# Patient Record
Sex: FEMALE | Race: OTHER | URBAN - METROPOLITAN AREA
[De-identification: names, ages, dates, MRNs, and addresses within clinical notes are randomized per-mention and may not be internally consistent; named-entity substitution may affect disease eponyms.]

---

## 2021-03-08 ENCOUNTER — TELEPHONE (OUTPATIENT)
Dept: FAMILY MEDICINE CLINIC | Age: 51
End: 2021-03-08

## 2021-03-08 NOTE — TELEPHONE ENCOUNTER
----- Message from Anny Falk sent at 3/8/2021  3:29 PM EST -----  Regarding: [facility] Front Office [employer] Message/Vendor [employer] first and last name: Birdia Fleischer [[id number]]      Reason for call: Provider Edy [address] Dr required yes/no and why: Yes      Best contact number(s):  [phone]      Details to clarify the request: pt is requesting confirmation of typre of care providers within practice is able to provide. Pt may need a chinese-mandarin .  Pt is requesting callback as early as 7:30am      Anny Falk

## 2021-03-12 ENCOUNTER — NURSE TRIAGE (OUTPATIENT)
Dept: OTHER | Facility: CLINIC | Age: 51
End: 2021-03-12

## 2021-03-12 NOTE — TELEPHONE ENCOUNTER
Heart beating fast. Feels short of breath. Reason for Disposition   Difficulty breathing    Answer Assessment - Initial Assessment Questions  1. DESCRIPTION: \"Please describe your heart rate or heart beat that you are having\" (e.g., fast/slow, regular/irregular, skipped or extra beats, \"palpitations\")      Feels like it is beating fast, palpitations. 2. ONSET: \"When did it start? \" (Minutes, hours or days)       Monday     3. DURATION: \"How long does it last\" (e.g., seconds, minutes, hours)  Unsure         4. PATTERN \"Does it come and go, or has it been constant since it started? \"  \"Does it get worse with exertion? \"   \"Are you feeling it now? \"      States she is not really paying attention. 5. TAP: \"Using your hand, can you tap out what you are feeling on a chair or table in front of you, so that I can hear? \" (Note: not all patients can do this)        N/a     6. HEART RATE: \"Can you tell me your heart rate? \" \"How many beats in 15 seconds? \"  (Note: not all patients can do this)        N/a     7. RECURRENT SYMPTOM: \"Have you ever had this before? \" If so, ask: \"When was the last time? \" and \"What happened that time? \"       No     8. CAUSE: \"What do you think is causing the palpitations? \"      1 year ago her mom passed away. She got emotional stating this. 9. CARDIAC HISTORY: \"Do you have any history of heart disease? \" (e.g., heart attack, angina, bypass surgery, angioplasty, arrhythmia)       No     10. OTHER SYMPTOMS: \"Do you have any other symptoms? \" (e.g., dizziness, chest pain, sweating, difficulty breathing)        Shortness of breath. 11. PREGNANCY: \"Is there any chance you are pregnant? \" \"When was your last menstrual period? \"        *No Answer*    Protocols used: HEART RATE AND HEARTBEAT QUESTIONS-ADULT-[place]    Patient called [facility] with red flag complaint. Call received from cold call. Brief description of triage: heart palpitations and shortness of breath.      Triage indicates for patient to go to ED. Caller plans to [facility] ED. Care advice provided, patient verbalizes understanding; denies any other questions or concerns; instructed to call back for any new or worsening symptoms. Attention Provider: Thank you for allowing me to participate in the care of your patient. The patient was connected to triage in response to information from calling the [employer]. Please do not respond through this encounter as the response is not directed to a shared pool.     Reminders:     Call [address] [place] [address] [address] Provider/Office    Care Advice - both instruction and documentation    Routing - PCP     PLEASE DELETE ALL RED TEXT PRIOR TO SIGNING NOTE

## 2021-03-15 ENCOUNTER — TELEPHONE (OUTPATIENT)
Dept: FAMILY MEDICINE CLINIC | Age: 51
End: 2021-03-15

## 2021-03-15 NOTE — TELEPHONE ENCOUNTER
----- Message from Marcelle Diane sent at 3/15/2021  8:02 AM EDT -----  Regarding: Dr Lj Ramirez: [phone]  Appointment not available    Caller's first and last name and relationship to patient (if not the patient):      Best contact [phone]      Preferred date and time:      Scheduled appointment date and time:      Reason for appointment:new patient-having dizziness and heart racing      Details to clarify the request:pt was seen at [address] [place] [address] on 3/12/21      Marcelle Diane

## 2021-03-15 NOTE — TELEPHONE ENCOUNTER
Bridgett Dolan has tried calling the pt but no answer and no return PC. Can nurses give directions to this pt? Should she go to the ED?

## 2021-03-17 ENCOUNTER — TELEPHONE (OUTPATIENT)
Dept: FAMILY MEDICINE CLINIC | Age: 51
End: 2021-03-17

## 2021-03-17 NOTE — TELEPHONE ENCOUNTER
Inna Wiggins         3/15/21 9:41 AM  Note     Candelario Miramontes has tried calling the pt but no answer and no return PC. Can nurses give directions to this pt?  Should she go to the ED?               3/15/21 9:41 AM  Patricia HALL routed this conversation to [facility] Nurses   Me  to Inna Wiggins         3/15/21 9:52 AM  Note     Tried calling patient x2 line was busy.

## 2021-03-17 NOTE — TELEPHONE ENCOUNTER
----- Message from Greg Valencia sent at 3/17/2021  2:28 PM EDT -----  Regarding: PARUL Amaral/Telephone  Patient return call    Caller's first and last name and relationship (if not the patient): pt      Best contact number(s): [phone]      Whose call is being returned: unknown      Details to clarify the request: Pt is unsure of reason for call.       Greg Valencia

## 2021-03-18 ENCOUNTER — VIRTUAL VISIT (OUTPATIENT)
Dept: FAMILY MEDICINE CLINIC | Age: 51
End: 2021-03-18
Payer: MEDICAID

## 2021-03-18 DIAGNOSIS — R06.09 DYSPNEA ON EXERTION: ICD-10-CM

## 2021-03-18 DIAGNOSIS — Z76.89 ENCOUNTER TO ESTABLISH CARE: ICD-10-CM

## 2021-03-18 DIAGNOSIS — R53.83 FATIGUE, UNSPECIFIED TYPE: ICD-10-CM

## 2021-03-18 DIAGNOSIS — R00.2 PALPITATIONS: Primary | ICD-10-CM

## 2021-03-18 PROCEDURE — 99443 PR PHYS/QHP TELEPHONE EVALUATION 21-30 MIN: CPT | Performed by: NURSE PRACTITIONER

## 2021-03-18 NOTE — PROGRESS NOTES
Chief Complaint   Patient presents with   Kurtz Establish Care    Fatigue     1. Have you been to the ER, urgent care clinic since your last visit? Hospitalized since your last visit? Yes  [facility] 9 3-2021 for weakness      2. Have you seen or consulted any other health care providers outside of the [address] [place] [address] since your last visit? Include any pap smears or colon screening. No    Abuse Screening Questionnaire 3/18/2021   Do you ever feel afraid of your partner? N   Are you in a relationship with someone who physically or mentally threatens you? N   Is it safe for you to go home? Y     3 most recent PHQ Screens 3/18/2021   Little interest or pleasure in doing things Not at all   Feeling down, depressed, irritable, or hopeless Not at all   Total Score PHQ 2 0     Learning Assessment 3/18/2021   PRIMARY LEARNER Patient   PRIMARY LANGUAGE ENGLISH   LEARNER PREFERENCE PRIMARY LISTENING   ANSWERED BY patient   RELATIONSHIP SELF     Fall Risk Assessment, last 12 mths 3/18/2021   Able to walk? Yes   Fall in past 12 months? 0   Do you feel unsteady?  0   Are you worried about falling 0

## 2021-03-19 NOTE — PROGRESS NOTES
Boo Matos is a 49 y.o. female, evaluated via audio-only technology on 3/18/2021 for Establish Care and Fatigue  . Seen at  [facility] for fatigue and palpitations. I personally reviewed the hospital encounter. Condition persists. Having CLARKE and palpitations. We  discussed a referral to Dr. Devin Dietrich cardiology for a work up. She is receptive. Assessment & Plan:     Diagnoses and all orders for this visit:    1. Palpitations  -     REFERRAL TO CARDIOLOGY    2. Dyspnea on exertion  -     REFERRAL TO CARDIOLOGY    3. Fatigue, unspecified type  -     REFERRAL TO CARDIOLOGY    4. Encounter to establish care      The complexity of medical decision making for this visit is moderate   Follow-up and Dispositions    · Return in about 1 month (around 4/18/2021). Routing History          12  Subjective:       Prior to Admission medications    Not on File     There is no problem list on file for this patient. There are no active problems to display for this patient. No Known Allergies  History reviewed. No pertinent past medical history. History reviewed. No pertinent surgical history. History reviewed. No pertinent family history. Social History     Tobacco Use    Smoking status: Not on file   Substance Use Topics    Alcohol use: Not on file       ROS  Pertinent items are noted in the HPI  Patient-Reported Vitals 3/18/2021   Patient-Reported Temperature 97.7        Boo Matos, who was evaluated through a patient-initiated, synchronous (real-time) audio only encounter, and/or her healthcare decision maker, is aware that it is a billable service, with coverage as determined by her insurance carrier. She provided verbal consent to proceed: Yes. She has not had a related appointment within my department in the past 7 days or scheduled within the next 24 hours.       Total Time: minutes: 21-30 minutes    Yen Baron NP

## 2021-04-19 ENCOUNTER — OFFICE VISIT (OUTPATIENT)
Dept: FAMILY MEDICINE CLINIC | Age: 51
End: 2021-04-19
Payer: MEDICAID

## 2021-04-19 VITALS
BODY MASS INDEX: 24.26 KG/M2 | DIASTOLIC BLOOD PRESSURE: 72 MMHG | HEART RATE: 71 BPM | RESPIRATION RATE: 18 BRPM | HEIGHT: 60 IN | TEMPERATURE: 97.6 F | SYSTOLIC BLOOD PRESSURE: 118 MMHG | WEIGHT: 123.6 LBS | OXYGEN SATURATION: 100 %

## 2021-04-19 DIAGNOSIS — R10.9 STOMACH PAIN: ICD-10-CM

## 2021-04-19 DIAGNOSIS — R00.2 PALPITATIONS: Primary | ICD-10-CM

## 2021-04-19 DIAGNOSIS — Z71.89 GRIEF COUNSELING: ICD-10-CM

## 2021-04-19 DIAGNOSIS — R06.09 DYSPNEA ON EXERTION: ICD-10-CM

## 2021-04-19 DIAGNOSIS — Z11.59 ENCOUNTER FOR HEPATITIS C SCREENING TEST FOR LOW RISK PATIENT: ICD-10-CM

## 2021-04-19 DIAGNOSIS — R53.83 FATIGUE, UNSPECIFIED TYPE: ICD-10-CM

## 2021-04-19 PROCEDURE — 99214 OFFICE O/P EST MOD 30 MIN: CPT | Performed by: NURSE PRACTITIONER

## 2021-04-19 PROCEDURE — 93000 ELECTROCARDIOGRAM COMPLETE: CPT | Performed by: NURSE PRACTITIONER

## 2021-04-19 PROCEDURE — 36415 COLL VENOUS BLD VENIPUNCTURE: CPT | Performed by: NURSE PRACTITIONER

## 2021-04-19 NOTE — PROGRESS NOTES
1. Have you been to the ER, urgent care clinic since your last visit? Hospitalized since your last visit? Yes ER 3-2021 [facility] for palpitations and dizziness    2. Have you seen or consulted any other health care providers outside of the [address] [place] [address] since your last visit? Include any pap smears or colon screening.  No      Chief Complaint   Patient presents with    Establish Care    Palpitations     f/u  ER  [facility]         Visit Vitals  /72 (BP 1 Location: Left upper arm, BP Patient Position: Sitting, BP Cuff Size: Adult)   Pulse 71   Temp 97.6 °F (36.4 °C) (Temporal)   Resp 18   Ht 5' (1.524 m)   Wt 123 lb 9.6 oz (56.1 kg)   LMP 03/19/2021 (Approximate)   SpO2 100%   BMI 24.14 kg/m²       Pain Scale: 0 - No pain/10  Pain Location:

## 2021-04-20 ENCOUNTER — OFFICE VISIT (OUTPATIENT)
Dept: FAMILY MEDICINE CLINIC | Age: 51
End: 2021-04-20
Payer: MEDICAID

## 2021-04-20 VITALS
BODY MASS INDEX: 24.26 KG/M2 | TEMPERATURE: 98 F | RESPIRATION RATE: 18 BRPM | OXYGEN SATURATION: 98 % | HEART RATE: 72 BPM | DIASTOLIC BLOOD PRESSURE: 62 MMHG | HEIGHT: 60 IN | WEIGHT: 123.6 LBS | SYSTOLIC BLOOD PRESSURE: 100 MMHG

## 2021-04-20 DIAGNOSIS — N60.02 CYST OF LEFT BREAST: ICD-10-CM

## 2021-04-20 DIAGNOSIS — Z01.419 WELL WOMAN EXAM: Primary | ICD-10-CM

## 2021-04-20 DIAGNOSIS — N60.01 BREAST CYST, RIGHT: ICD-10-CM

## 2021-04-20 DIAGNOSIS — Z12.11 COLON CANCER SCREENING: ICD-10-CM

## 2021-04-20 LAB
25(OH)D3 SERPL-MCNC: 13.8 NG/ML (ref 30–100)
ALBUMIN SERPL-MCNC: 4.3 G/DL (ref 3.5–5)
ALBUMIN/GLOB SERPL: 1.3 {RATIO} (ref 1.1–2.2)
ALP SERPL-CCNC: 75 U/L (ref 45–117)
ALT SERPL-CCNC: 19 U/L (ref 12–78)
ANION GAP SERPL CALC-SCNC: 8 MMOL/L (ref 5–15)
AST SERPL-CCNC: 12 U/L (ref 15–37)
BASOPHILS # BLD: 0 K/UL (ref 0–0.1)
BASOPHILS NFR BLD: 1 % (ref 0–1)
BILIRUB SERPL-MCNC: 0.3 MG/DL (ref 0.2–1)
BUN SERPL-MCNC: 10 MG/DL (ref 6–20)
BUN/CREAT SERPL: 17 (ref 12–20)
CALCIUM SERPL-MCNC: 8.9 MG/DL (ref 8.5–10.1)
CHLORIDE SERPL-SCNC: 106 MMOL/L (ref 97–108)
CHOLEST SERPL-MCNC: 219 MG/DL
CO2 SERPL-SCNC: 25 MMOL/L (ref 21–32)
CREAT SERPL-MCNC: 0.58 MG/DL (ref 0.55–1.02)
DIFFERENTIAL METHOD BLD: NORMAL
EOSINOPHIL # BLD: 0 K/UL (ref 0–0.4)
EOSINOPHIL NFR BLD: 0 % (ref 0–7)
ERYTHROCYTE [DISTWIDTH] IN BLOOD BY AUTOMATED COUNT: 13.3 % (ref 11.5–14.5)
FOLATE SERPL-MCNC: 19.1 NG/ML (ref 5–21)
GLOBULIN SER CALC-MCNC: 3.3 G/DL (ref 2–4)
GLUCOSE SERPL-MCNC: 90 MG/DL (ref 65–100)
HCT VFR BLD AUTO: 41.4 % (ref 35–47)
HCV AB SERPL QL IA: NONREACTIVE
HCV COMMENT,HCGAC: NORMAL
HDLC SERPL-MCNC: 59 MG/DL
HDLC SERPL: 3.7 {RATIO} (ref 0–5)
HGB BLD-MCNC: 12.7 G/DL (ref 11.5–16)
IMM GRANULOCYTES # BLD AUTO: 0 K/UL (ref 0–0.04)
IMM GRANULOCYTES NFR BLD AUTO: 0 % (ref 0–0.5)
LDLC SERPL CALC-MCNC: 127 MG/DL (ref 0–100)
LIPID PROFILE,FLP: ABNORMAL
LYMPHOCYTES # BLD: 1.9 K/UL (ref 0.8–3.5)
LYMPHOCYTES NFR BLD: 32 % (ref 12–49)
MCH RBC QN AUTO: 28.9 PG (ref 26–34)
MCHC RBC AUTO-ENTMCNC: 30.7 G/DL (ref 30–36.5)
MCV RBC AUTO: 94.1 FL (ref 80–99)
MONOCYTES # BLD: 0.4 K/UL (ref 0–1)
MONOCYTES NFR BLD: 6 % (ref 5–13)
NEUTS SEG # BLD: 3.6 K/UL (ref 1.8–8)
NEUTS SEG NFR BLD: 61 % (ref 32–75)
NRBC # BLD: 0 K/UL (ref 0–0.01)
NRBC BLD-RTO: 0 PER 100 WBC
PLATELET # BLD AUTO: 307 K/UL (ref 150–400)
PMV BLD AUTO: 10.1 FL (ref 8.9–12.9)
POTASSIUM SERPL-SCNC: 4.1 MMOL/L (ref 3.5–5.1)
PROT SERPL-MCNC: 7.6 G/DL (ref 6.4–8.2)
RBC # BLD AUTO: 4.4 M/UL (ref 3.8–5.2)
SODIUM SERPL-SCNC: 139 MMOL/L (ref 136–145)
TRIGL SERPL-MCNC: 165 MG/DL (ref ?–150)
VIT B12 SERPL-MCNC: 797 PG/ML (ref 193–986)
VLDLC SERPL CALC-MCNC: 33 MG/DL
WBC # BLD AUTO: 5.9 K/UL (ref 3.6–11)

## 2021-04-20 PROCEDURE — 99396 PREV VISIT EST AGE 40-64: CPT | Performed by: NURSE PRACTITIONER

## 2021-04-20 NOTE — PROGRESS NOTES
Subjective:   49 y.o. female for Well Woman Check. No LMP recorded. Menses due this week. Social History: not sexually active.  passed away recently. Pertinent past medical hstory: no history of HTN, DVT, CAD, DM, liver disease, migraines or smoking. There is no problem list on file for this patient. There are no active problems to display for this patient. No Known Allergies  History reviewed. No pertinent past medical history. History reviewed. No pertinent surgical history. Family History   Problem Relation Age of Onset    Diabetes Mother     Hypertension Mother    [address] [facility] Arthritis-rheumatoid Father      Social History     Tobacco Use    Smoking status: Never Smoker    Smokeless tobacco: Never Used   Substance Use Topics    Alcohol use: Never     Frequency: Never     Binge frequency: Never        ROS:  Feeling well. No dyspnea or chest pain on exertion. No abdominal pain, change in bowel habits, black or bloody stools. No urinary tract symptoms. GYN ROS: normal menses, no abnormal bleeding, pelvic pain or discharge, she complains of  Lumps both right d left and left breasts  . No neurological complaints. Objective:     Visit Vitals  /62 (BP 1 Location: Right upper arm, BP Patient Position: Sitting, BP Cuff Size: Adult)   Pulse 72   Temp 98 °F (36.7 °C) (Core)   Resp 18   Ht 5' (1.524 m)   Wt 123 lb 9.6 oz (56.1 kg)   SpO2 98%   BMI 24.14 kg/m²     The patient appears well, alert, oriented x 3, in no distress. ENT normal.  Neck supple. No adenopathy or thyromegaly. LOULOU. Lungs are clear, good air entry, no wheezes, rhonchi or rales. S1 and S2 normal, no murmurs, regular rate and rhythm. Abdomen soft without tenderness, guarding, mass or organomegaly. Extremities show no edema, normal peripheral pulses. Neurological is normal, no focal findings. BREAST EXAM:  Right breast palpable lump x 2 -4 o clock and 8 o clock  No dimpling , nipple discharge or retraction.   No palpable axillary lymph nodes. Left breast palpable lump at 5  O'clock . All lumps/movable cyst like. PELVIC EXAM: normal external genitalia, vulva, vagina, cervix, uterus and adnexa. Menses noted at the cervical OS. Assessment/Plan:   well woman  mammogram  pap smear  return annually or prn    ICD-10-CM ICD-9-CM    1. Well woman exam  Z01.419 V72.31 PAP IG, APTIMA HPV AND RFX 16/18,45 ([id number])      OCCULT BLOOD IMMUNOASSAY,DIAGNOSTIC      PAP IG, APTIMA HPV AND RFX 16/18,45 ([id number])   2. Cyst of left breast  N60.02 610.0 ILIA 3D MISTY W MAMMO BI SCREENING INCL CAD   3. Breast cyst, right  N60.01 610.0 ILIA 3D MISTY W MAMMO BI SCREENING INCL CAD   4. Colon cancer screening  Z12.11 V76.51 OCCULT BLOOD IMMUNOASSAY,DIAGNOSTIC     Encounter Diagnoses   Name Primary?  Well woman exam Yes    Cyst of left breast     Breast cyst, right     Colon cancer screening      Orders Placed This Encounter    ILIA 3D MISTY W MAMMO BI SCREENING INCL CAD    OCCULT BLOOD IMMUNOASSAY,DIAGNOSTIC    PAP IG, APTIMA HPV AND RFX 16/18,45 ([id number])     Diagnoses and all orders for this visit:    1. Well woman exam  -     PAP IG, APTIMA HPV AND RFX 16/18,45 ([id number]); Future  -     OCCULT BLOOD IMMUNOASSAY,DIAGNOSTIC    2. Cyst of left breast  -     ILIA 3D MISTY W MAMMO BI SCREENING INCL CAD; Future    3. Breast cyst, right  -     ILIA 3D MISTY W MAMMO BI SCREENING INCL CAD; Future    4. Colon cancer screening  -     OCCULT BLOOD IMMUNOASSAY,DIAGNOSTIC        .

## 2021-04-20 NOTE — PROGRESS NOTES
1. Have you been to the ER, urgent care clinic since your last visit? Hospitalized since your last visit? No    2. Have you seen or consulted any other health care providers outside of the [address] [place] [address] since your last visit? Include any pap smears or colon screening.  No      Chief Complaint   Patient presents with    Well Woman         Visit Vitals  /62 (BP 1 Location: Right upper arm, BP Patient Position: Sitting, BP Cuff Size: Adult)   Pulse 72   Temp 98 °F (36.7 °C) (Core)   Ht 5' (1.524 m)   Wt 123 lb 9.6 oz (56.1 kg)   SpO2 98%   BMI 24.14 kg/m²       Pain Scale: 0 - No pain/10  Pain Location:

## 2021-04-21 NOTE — PROGRESS NOTES
Good news B vitamin levels are within normal limits   Negative screening for hepatitis C   CBC no anemia   Areas to address   Vitamin D level is low . Sending in a RX for vitamin D 50,000 ul take 1 pill every 7 days.   Recheck vitamin D level in 2 months   Metabolic panel good liver and kidney function  Cholesterol level is a little elevated   Healthy eating plan   \" Emphasizes vegetables, fruits, whole grains, and fat-free or low-fat dairy products  \" Includes lean meats, poultry, fish, beans, eggs, and nuts  \" Limits saturated and trans fats, sodium, and added sugars

## 2021-04-21 NOTE — ACP (ADVANCE CARE PLANNING)
Discussed importance of advanced medical directives with patient. Patient is capable of making decisions.  Jamar Celeste NP-C

## 2021-04-22 LAB — HEMOCCULT STL QL IA: NEGATIVE

## 2021-04-23 NOTE — ACP (ADVANCE CARE PLANNING)
Discussed importance of advanced medical directives with patient. Patient is capable of making decisions.  Sade Moise NP-C

## 2021-04-23 NOTE — PROGRESS NOTES
Subjective:     Yasemin Lyle is a 49 y.o. female who presents today with the following:  Chief Complaint   Patient presents with    [facility]    Palpitations     f/u  ER  [facility] [place]    Grief Counseling      passed 1 month       There is no problem list on file for this patient. COMPLIANT WITH MEDICATION:    Denies chest pain, dyspnea, (+) palpitations,(-) headache and (-) blurred vision. Blood pressure normotensive. Palpitations persistent on a daily basis. , comes in waves. Resolves with rest.     Grief lost  a month ago to Magdalene. Very tearful. depression screening addressed not at risk    abuse screening addressed denies    learning assessment addressed reviewed nurses notes    fall risk addressed not at risk    HM: addressed    ROS:  Gen: denies fever, chills, fatigue, weight loss, weight gain  HEENT:denies blurry vision, nasal congestion, sore throat  Resp: denies dypsnea, cough, wheezing  CV: denies chest pain radiating to the jaws or arms, palpitations, lower extremity edema  Abd: denies nausea, vomiting, diarrhea, constipation  Neuro: denies numbness/tingling  Endo: denies polyuria, polydipsia, heat/cold intolerance  Heme: no lymphadenopathy    No Known Allergies    No current outpatient medications on file. History reviewed. No pertinent past medical history. History reviewed. No pertinent surgical history.     Social History     Tobacco Use   Smoking Status Never Smoker   Smokeless Tobacco Never Used       Social History     Socioeconomic History    Marital status:      Spouse name: Not on file    Number of children: Not on file    Years of education: Not on file    Highest education level: Not on file   Tobacco Use    Smoking status: Never Smoker    Smokeless tobacco: Never Used   Substance and Sexual Activity    Alcohol use: Never     Frequency: Never     Binge frequency: Never    Drug use: Never       Family History   Problem Relation Age of Onset    Diabetes Mother     Hypertension Mother     Arthritis-rheumatoid Father          Objective:     Visit Vitals  /72 (BP 1 Location: Left upper arm, BP Patient Position: Sitting, BP Cuff Size: Adult)   Pulse 71   Temp 97.6 °F (36.4 °C) (Temporal)   Resp 18   Ht 5' (1.524 m)   Wt 123 lb 9.6 oz (56.1 kg)   LMP 03/19/2021 (Approximate)   SpO2 100%   BMI 24.14 kg/m²     Body mass index is 24.14 kg/m². General: Alert and oriented. No acute distress. Well nourished  HEENT :  Ears:TMs are normal. Canals are clear. Eyes: pupils equal, round, react to light and accommodation. Extra ocular movements intact. Nose: patent. Mouth and throat is clear. Neck:supple full range of motion no thyromegaly. Trachea midline, No carotid bruits. No significant lymphadenopathy  Lungs[de-identified] clear to auscultation without wheezes, rales, or rhonchi. Heart :RRR, S1 & S2 are normal intensity. No murmur; no gallop  Abdomen: bowel sounds active. No tenderness, guarding, rebound, masses, hepatic or spleen enlargement  Back: no CVA tenderness. Extremities: without clubbing, cyanosis, or edema  Pulses: radial and femoral pulses are normal  Neuro: HMF intact. Cranial nerves II through XII grossly normal.  Motor: is 5 over 5 and symmetrical.   Deep tendon reflexes: +2 equal  Psych:appropriate behavior, mood, affect and judgement. Results for orders placed or performed in visit on 04/19/21   VITAMIN D, 25 HYDROXY   Result Value Ref Range    Vitamin D 25-Hydroxy 13.8 (L) 30 - 100 ng/mL   VITAMIN B12 & FOLATE   Result Value Ref Range    Vitamin B12 797 193 - 986 pg/mL    Folate 19.1 5.0 - 21.0 ng/mL   HEPATITIS C AB   Result Value Ref Range    Hep C virus Ab Interp.  NONREACTIVE NONREACTIVE      Hep C  virus Ab comment Method used is Siemens Advia Centaur     METABOLIC PANEL, COMPREHENSIVE   Result Value Ref Range    Sodium 139 136 - 145 mmol/L    Potassium 4.1 3.5 - 5.1 mmol/L    Chloride 106 97 - 108 mmol/L    CO2 25 21 - 32 mmol/L Anion gap 8 5 - 15 mmol/L    Glucose 90 65 - 100 mg/dL    BUN 10 6 - 20 MG/DL    Creatinine 0.58 0.55 - 1.02 MG/DL    BUN/Creatinine ratio 17 12 - 20      GFR est AA >60 >60 ml/min/1.73m2    GFR est non-AA >60 >60 ml/min/1.73m2    Calcium 8.9 8.5 - 10.1 MG/DL    Bilirubin, total 0.3 0.2 - 1.0 MG/DL    ALT (SGPT) 19 12 - 78 U/L    AST (SGOT) 12 (L) 15 - 37 U/L    Alk. phosphatase 75 45 - 117 U/L    Protein, total 7.6 6.4 - 8.2 g/dL    Albumin 4.3 3.5 - 5.0 g/dL    Globulin 3.3 2.0 - 4.0 g/dL    A-G Ratio 1.3 1.1 - 2.2     LIPID PANEL   Result Value Ref Range    LIPID PROFILE          Cholesterol, total 219 (H) <200 MG/DL    Triglyceride 165 (H) <150 MG/DL    HDL Cholesterol 59 MG/DL    LDL, calculated 127 (H) 0 - 100 MG/DL    VLDL, calculated 33 MG/DL    CHOL/HDL Ratio 3.7 0.0 - 5.0     CBC WITH AUTOMATED DIFF   Result Value Ref Range    WBC 5.9 3.6 - 11.0 K/uL    RBC 4.40 3.80 - 5.20 M/uL    HGB 12.7 11.5 - 16.0 g/dL    HCT 41.4 35.0 - 47.0 %    MCV 94.1 80.0 - 99.0 FL    MCH 28.9 26.0 - 34.0 PG    MCHC 30.7 30.0 - 36.5 g/dL    RDW 13.3 11.5 - 14.5 %    PLATELET 160 325 - 734 K/uL    MPV 10.1 8.9 - 12.9 FL    NRBC 0.0 0  WBC    ABSOLUTE NRBC 0.00 0.00 - 0.01 K/uL    NEUTROPHILS 61 32 - 75 %    LYMPHOCYTES 32 12 - 49 %    MONOCYTES 6 5 - 13 %    EOSINOPHILS 0 0 - 7 %    BASOPHILS 1 0 - 1 %    IMMATURE GRANULOCYTES 0 0.0 - 0.5 %    ABS. NEUTROPHILS 3.6 1.8 - 8.0 K/UL    ABS. LYMPHOCYTES 1.9 0.8 - 3.5 K/UL    ABS. MONOCYTES 0.4 0.0 - 1.0 K/UL    ABS. EOSINOPHILS 0.0 0.0 - 0.4 K/UL    ABS. BASOPHILS 0.0 0.0 - 0.1 K/UL    ABS. IMM.  GRANS. 0.0 0.00 - 0.04 K/UL    DF AUTOMATED         Results for orders placed or performed in visit on 04/19/21   VITAMIN D, 25 HYDROXY   Result Value Ref Range    Vitamin D 25-Hydroxy 13.8 (L) 30 - 100 ng/mL   VITAMIN B12 & FOLATE   Result Value Ref Range    Vitamin B12 797 193 - 986 pg/mL    Folate 19.1 5.0 - 21.0 ng/mL   HEPATITIS C AB   Result Value Ref Range    Hep C virus Ab Interp. NONREACTIVE NONREACTIVE      Hep C  virus Ab comment Method used is Siemens Advia Centaur     METABOLIC PANEL, COMPREHENSIVE   Result Value Ref Range    Sodium 139 136 - 145 mmol/L    Potassium 4.1 3.5 - 5.1 mmol/L    Chloride 106 97 - 108 mmol/L    CO2 25 21 - 32 mmol/L    Anion gap 8 5 - 15 mmol/L    Glucose 90 65 - 100 mg/dL    BUN 10 6 - 20 MG/DL    Creatinine 0.58 0.55 - 1.02 MG/DL    BUN/Creatinine ratio 17 12 - 20      GFR est AA >60 >60 ml/min/1.73m2    GFR est non-AA >60 >60 ml/min/1.73m2    Calcium 8.9 8.5 - 10.1 MG/DL    Bilirubin, total 0.3 0.2 - 1.0 MG/DL    ALT (SGPT) 19 12 - 78 U/L    AST (SGOT) 12 (L) 15 - 37 U/L    Alk. phosphatase 75 45 - 117 U/L    Protein, total 7.6 6.4 - 8.2 g/dL    Albumin 4.3 3.5 - 5.0 g/dL    Globulin 3.3 2.0 - 4.0 g/dL    A-G Ratio 1.3 1.1 - 2.2     LIPID PANEL   Result Value Ref Range    LIPID PROFILE          Cholesterol, total 219 (H) <200 MG/DL    Triglyceride 165 (H) <150 MG/DL    HDL Cholesterol 59 MG/DL    LDL, calculated 127 (H) 0 - 100 MG/DL    VLDL, calculated 33 MG/DL    CHOL/HDL Ratio 3.7 0.0 - 5.0     CBC WITH AUTOMATED DIFF   Result Value Ref Range    WBC 5.9 3.6 - 11.0 K/uL    RBC 4.40 3.80 - 5.20 M/uL    HGB 12.7 11.5 - 16.0 g/dL    HCT 41.4 35.0 - 47.0 %    MCV 94.1 80.0 - 99.0 FL    MCH 28.9 26.0 - 34.0 PG    MCHC 30.7 30.0 - 36.5 g/dL    RDW 13.3 11.5 - 14.5 %    PLATELET 415 400 - 994 K/uL    MPV 10.1 8.9 - 12.9 FL    NRBC 0.0 0  WBC    ABSOLUTE NRBC 0.00 0.00 - 0.01 K/uL    NEUTROPHILS 61 32 - 75 %    LYMPHOCYTES 32 12 - 49 %    MONOCYTES 6 5 - 13 %    EOSINOPHILS 0 0 - 7 %    BASOPHILS 1 0 - 1 %    IMMATURE GRANULOCYTES 0 0.0 - 0.5 %    ABS. NEUTROPHILS 3.6 1.8 - 8.0 K/UL    ABS. LYMPHOCYTES 1.9 0.8 - 3.5 K/UL    ABS. MONOCYTES 0.4 0.0 - 1.0 K/UL    ABS. EOSINOPHILS 0.0 0.0 - 0.4 K/UL    ABS. BASOPHILS 0.0 0.0 - 0.1 K/UL    ABS. IMM. GRANS. 0.0 0.00 - 0.04 K/UL    DF AUTOMATED         Assessment/ Plan:     1.  Dyspnea on exertion  Referral to cariology . - CBC WITH AUTOMATED DIFF; Future  - LIPID PANEL; Future  - METABOLIC PANEL, COMPREHENSIVE; Future  - COLLECTION VENOUS BLOOD,VENIPUNCTURE; Future  - AMB POC EKG ROUTINE W/ 12 LEADS, INTER & REP  - COLLECTION VENOUS BLOOD,VENIPUNCTURE  - METABOLIC PANEL, COMPREHENSIVE  - LIPID PANEL  - CBC WITH AUTOMATED DIFF    2. Palpitations  Referral to cardiologist placed prior visit . Will monitor as needed. - CBC WITH AUTOMATED DIFF; Future  - LIPID PANEL; Future  - METABOLIC PANEL, COMPREHENSIVE; Future  - COLLECTION VENOUS BLOOD,VENIPUNCTURE; Future  - AMB POC EKG ROUTINE W/ 12 LEADS, INTER & REP  - COLLECTION VENOUS BLOOD,VENIPUNCTURE  - METABOLIC PANEL, COMPREHENSIVE  - LIPID PANEL  - CBC WITH AUTOMATED DIFF    3. Fatigue, unspecified type    - CBC WITH AUTOMATED DIFF; Future  - LIPID PANEL; Future  - METABOLIC PANEL, COMPREHENSIVE; Future  - COLLECTION VENOUS BLOOD,VENIPUNCTURE; Future  - VITAMIN B12 & FOLATE; Future  - VITAMIN D, 25 HYDROXY; Future  - VITAMIN D, 25 HYDROXY  - VITAMIN B12 & FOLATE  - COLLECTION VENOUS BLOOD,VENIPUNCTURE  - METABOLIC PANEL, COMPREHENSIVE  - LIPID PANEL  - CBC WITH AUTOMATED DIFF    4. Stomach pain  Responds well to Tums. - CBC WITH AUTOMATED DIFF; Future  - LIPID PANEL; Future  - METABOLIC PANEL, COMPREHENSIVE; Future  - COLLECTION VENOUS BLOOD,VENIPUNCTURE; Future  - COLLECTION VENOUS BLOOD,VENIPUNCTURE  - METABOLIC PANEL, COMPREHENSIVE  - LIPID PANEL  - CBC WITH AUTOMATED DIFF    5. Grief counseling  Support provided. Look for opportunities to develop friendship We discussed relaxation techniques . 6. Encounter for hepatitis C screening test for low risk patient    - HEPATITIS C AB;  Future  - HEPATITIS C AB      Orders Placed This Encounter    COLLECTION VENOUS BLOOD,VENIPUNCTURE     Standing Status:   Future     Number of Occurrences:   1     Standing Expiration Date:   5/19/2021    CBC WITH AUTOMATED DIFF     Standing Status:   Future     Number of Occurrences: 1     Standing Expiration Date:   5/19/2021    LIPID PANEL     Standing Status:   Future     Number of Occurrences:   1     Standing Expiration Date:   5/65/3629    METABOLIC PANEL, COMPREHENSIVE     Standing Status:   Future     Number of Occurrences:   1     Standing Expiration Date:   5/19/2021    HEPATITIS C AB     Standing Status:   Future     Number of Occurrences:   1     Standing Expiration Date:   5/19/2021    VITAMIN B12 & FOLATE     Standing Status:   Future     Number of Occurrences:   1     Standing Expiration Date:   5/19/2021    VITAMIN D, 25 HYDROXY     Standing Status:   Future     Number of Occurrences:   1     Standing Expiration Date:   4/19/2022    AMB POC EKG ROUTINE W/ 12 LEADS, INTER & REP     Order Specific Question:   Reason for Exam:     Answer:   palpitations         Verbal and written instructions (see AVS) provided. Patient expresses understanding of diagnosis and treatment plan.     Health Maintenance Due   Topic Date Due    COVID-19 Vaccine (1) Never done    DTaP/Tdap/Td series (1 - Tdap) Never done    PAP AKA CERVICAL CYTOLOGY  Never done    Shingrix Vaccine Age 50> (1 of 2) Never done    Breast Cancer Screen Mammogram  Never done               [facility]

## 2021-04-26 ENCOUNTER — HOSPITAL ENCOUNTER (OUTPATIENT)
Dept: ULTRASOUND IMAGING | Age: 51
Discharge: HOME | End: 2021-04-26
Attending: NURSE PRACTITIONER
Payer: MEDICAID

## 2021-04-26 ENCOUNTER — HOSPITAL ENCOUNTER (OUTPATIENT)
Dept: MAMMOGRAPHY | Age: 51
Discharge: HOME | End: 2021-04-26
Attending: NURSE PRACTITIONER
Payer: MEDICAID

## 2021-04-26 DIAGNOSIS — R92.8 ABNORMAL MAMMOGRAM: ICD-10-CM

## 2021-04-26 DIAGNOSIS — N60.02 CYST OF LEFT BREAST: ICD-10-CM

## 2021-04-26 DIAGNOSIS — N60.01 BREAST CYST, RIGHT: ICD-10-CM

## 2021-04-26 PROCEDURE — 76642 ULTRASOUND BREAST LIMITED: CPT

## 2021-04-26 PROCEDURE — 77066 DX MAMMO INCL CAD BI: CPT

## 2021-04-27 ENCOUNTER — TELEPHONE (OUTPATIENT)
Dept: FAMILY MEDICINE CLINIC | Age: 51
End: 2021-04-27

## 2021-04-27 LAB
CYTOLOGIST CVX/VAG CYTO: NORMAL
CYTOLOGY CVX/VAG DOC CYTO: NORMAL
CYTOLOGY CVX/VAG DOC THIN PREP: NORMAL
HPV I/H RISK 4 DNA CVX QL PROBE+SIG AMP: NEGATIVE
Lab: NORMAL
OTHER STN SPEC: NORMAL
SPECIMEN STATUS REPORT, ROLRST: NORMAL
STAT OF ADQ CVX/VAG CYTO-IMP: NORMAL

## 2021-04-27 NOTE — TELEPHONE ENCOUNTER
----- Message from Samantha Raman sent at 4/27/2021  2:19 PM EDT -----  Regarding: Salty Maurer, PARUL/telephone  Patient return call    Caller's first and last name and relationship (if not the patient):      Best contact number(s): [phone]      Whose call is being returned: Tim Daniels Nurse       Details to clarify the request: Patient returned a call she received for the Nurse  about her lab results and would like a call back .        April [address] [place]

## 2021-05-18 ENCOUNTER — OFFICE VISIT (OUTPATIENT)
Dept: CARDIOLOGY CLINIC | Age: 51
End: 2021-05-18
Payer: MEDICAID

## 2021-05-18 VITALS
DIASTOLIC BLOOD PRESSURE: 80 MMHG | HEIGHT: 60 IN | OXYGEN SATURATION: 98 % | TEMPERATURE: 97.7 F | WEIGHT: 124 LBS | HEART RATE: 67 BPM | BODY MASS INDEX: 24.35 KG/M2 | SYSTOLIC BLOOD PRESSURE: 102 MMHG | RESPIRATION RATE: 16 BRPM

## 2021-05-18 DIAGNOSIS — R06.09 DOE (DYSPNEA ON EXERTION): ICD-10-CM

## 2021-05-18 DIAGNOSIS — R07.89 CHEST PAIN, ATYPICAL: Primary | ICD-10-CM

## 2021-05-18 DIAGNOSIS — R00.2 PALPITATIONS: ICD-10-CM

## 2021-05-18 DIAGNOSIS — R94.31 ABNORMAL EKG: ICD-10-CM

## 2021-05-18 PROCEDURE — 99203 OFFICE O/P NEW LOW 30 MIN: CPT | Performed by: INTERNAL MEDICINE

## 2021-05-18 NOTE — PROGRESS NOTES
Identified pt with two pt identifiers(name and ). Reviewed record in preparation for visit and have obtained necessary documentation. Chief Complaint   Patient presents with    Breathing Problem     CLARKE; has \"hard time taking a deep breath\"     Palpitations     pt reports pain between shoulder blades when experiencing palps      Vitals:    21 1516   BP: 102/80   Pulse: 67   Resp: 16   Temp: 97.7 °F (36.5 °C)   TempSrc: Temporal   SpO2: 98%   Weight: 124 lb (56.2 kg)   Height: 5' (1.524 m)   PainSc:   0 - No pain       Health Maintenance Review: Patient reminded of \"due or due soon\" health maintenance. I have asked the patient to contact his/her primary care provider (PCP) for follow-up on his/her health maintenance. Coordination of Care Questionnaire:  :   1) Have you been to an emergency room, urgent care, or hospitalized since your last visit? If yes, where when, and reason for visit? no       2. Have seen or consulted any other health care provider since your last visit? If yes, where when, and reason for visit? NO      Patient is accompanied by self I have received verbal consent from Triston Davidson to discuss any/all medical information while they are present in the room.

## 2021-05-18 NOTE — PROGRESS NOTES
Yumi Serrano is a 49 y.o. female is here for cardiac evaluation--symptoms of dyspnea on exertion, palpitations. Previously healthy, no prior cardiac hx or meds, seen by PCP for wellness check.  recently passed away. Sx of CLARKE, intermittent palpitations, some pain across back/shoulder blades (more with inspiration). Seen in ER at [facility] 16 3/12/21--EKG normal, CXR normal, labs ok incl troponin. EKG 4/19/21 with sinus lorie, RSR', low voltage precordial leads. Labs with normal CBC, CMP. Lipids mildly abnormal.   The patient denies  orthopnea, PND, LE edema, palpitations, syncope, presyncope or fatigue. Patient Active Problem List    Diagnosis Date Noted    CLARKE (dyspnea on exertion) 05/18/2021    Palpitations 05/18/2021    Abnormal EKG 05/18/2021    Chest pain, atypical 05/18/2021      Sonaddis Villanueva NP  Past Medical History:   Diagnosis Date    CLARKE (dyspnea on exertion) 5/18/2021    Palpitations 5/18/2021      No past surgical history on file.   No Known Allergies   Family History   Problem Relation Age of Onset    Diabetes Mother     Hypertension Mother    Claus Re Arthritis-rheumatoid Father       Social History     Socioeconomic History    Marital status:      Spouse name: Not on file    Number of children: Not on file    Years of education: Not on file    Highest education level: Not on file   Occupational History    Not on file   Social Needs    Financial resource strain: Not on file    Food insecurity     Worry: Not on file     Inability: Not on file   [employer] needs     Medical: Not on file     Non-medical: Not on file   Tobacco Use    Smoking status: Never Smoker    Smokeless tobacco: Never Used   Substance and Sexual Activity    Alcohol use: Never     Frequency: Never     Binge frequency: Never    Drug use: Never    Sexual activity: Not on file   Lifestyle    Physical activity     Days per week: Not on file     Minutes per session: Not on file    Stress: Not on file   Relationships    Social connections     Talks on phone: Not on file     Gets together: Not on file     Attends [employer] service: Not on file     Active member of club or organization: Not on file     Attends meetings of clubs or organizations: Not on file     Relationship status: Not on file    Intimate partner violence     Fear of current or ex partner: Not on file     Emotionally abused: Not on file     Physically abused: Not on file     Forced sexual activity: Not on file   Other Topics Concern    Not on file   Social History Narrative    Not on file      No current outpatient medications on file. No current facility-administered medications for this visit. Review of Symptoms:    CONST  No weight change. No fever, chills, sweats    ENT No visual changes, URI sx, sore throat    CV  See HPI   RESP  No cough, or sputum, wheezing. Also see HPI   GI  No abdominal pain or change in bowel habits. No heartburn or dysphagia. No melena or rectal bleeding.   No dysuria, urgency, frequency, hematuria   MSKEL  No joint pain, swelling. No muscle pain. SKIN  No rash or lesions. NEURO  No headache, syncope, or seizure. No weakness, loss of sensation, or paresthesias. PSYCH  No low mood or depression  No anxiety. HE/LYMPH  No easy bruising, abnormal bleeding, or enlarged glands. Physical ExamPhysical Exam:    Visit Vitals  /80   Pulse 67   Temp 97.7 °F (36.5 °C) (Temporal)   Resp 16   Ht 5' (1.524 m)   Wt 124 lb (56.2 kg)   SpO2 98%   BMI 24.22 kg/m²     Gen: NAD  HEENT:  PERRL, throat clear  Neck: no adenopathy, no thyromegaly, no JVD   Heart:  Regular,Nl S1S2,  no murmur, gallop or rub. Lungs:  clear  Abdomen:   Soft, non-tender, bowel sounds are active.    Extremities:  No edema  Pulse: symmetric  Neuro: A&O times 3, No focal neuro deficits    Cardiographics    ECG: from 4/19/21--NSR, RSR', PRWP    Labs:   Lab Results   Component Value Date/Time Sodium 139 04/19/2021 12:42 PM    Potassium 4.1 04/19/2021 12:42 PM    Chloride 106 04/19/2021 12:42 PM    CO2 25 04/19/2021 12:42 PM    Anion gap 8 04/19/2021 12:42 PM    Glucose 90 04/19/2021 12:42 PM    BUN 10 04/19/2021 12:42 PM    Creatinine 0.58 04/19/2021 12:42 PM    BUN/Creatinine ratio 17 04/19/2021 12:42 PM    GFR est AA >60 04/19/2021 12:42 PM    GFR est non-AA >60 04/19/2021 12:42 PM    Calcium 8.9 04/19/2021 12:42 PM    Bilirubin, total 0.3 04/19/2021 12:42 PM    Alk. phosphatase 75 04/19/2021 12:42 PM    Protein, total 7.6 04/19/2021 12:42 PM    Albumin 4.3 04/19/2021 12:42 PM    Globulin 3.3 04/19/2021 12:42 PM    A-G Ratio 1.3 04/19/2021 12:42 PM    ALT (SGPT) 19 04/19/2021 12:42 PM     No results found for: CPK, CPKX, CPX  Lab Results   Component Value Date/Time    Cholesterol, total 219 (H) 04/19/2021 12:42 PM    HDL Cholesterol 59 04/19/2021 12:42 PM    LDL, calculated 127 (H) 04/19/2021 12:42 PM    Triglyceride 165 (H) 04/19/2021 12:42 PM    CHOL/HDL Ratio 3.7 04/19/2021 12:42 PM     No results found for this or any previous visit. Assessment:         Patient Active Problem List    Diagnosis Date Noted    CLARKE (dyspnea on exertion) 05/18/2021    Palpitations 05/18/2021    Abnormal EKG 05/18/2021    Chest pain, atypical 05/18/2021     49 y.o. female is here for cardiac evaluation--symptoms of dyspnea on exertion, palpitations. Previously healthy, no prior cardiac hx or meds, seen by PCP for wellness check.  recently passed away--still grieving, crying. Sx of CLARKE, intermittent palpitations, some pain across back/shoulder blades (more with inspiration). Se[facility] 3/12/21--EKG normal, CXR normal, labs ok incl troponin. EKG 4/19/21 with sinus lorie, RSR', low voltage precordial leads. Labs with normal CBC, CMP.   Lipids mildly abnormal.      Plan:     Stress Treadmill EKG--CP, CLARKE  Echo/doppler--r/o structural heart disease  Labs per PCP    Nasreen Francisco MD

## 2021-05-19 ENCOUNTER — TELEPHONE (OUTPATIENT)
Dept: NON INVASIVE DIAGNOSTICS | Age: 51
End: 2021-05-19

## 2021-05-24 ENCOUNTER — TELEPHONE (OUTPATIENT)
Dept: NON INVASIVE DIAGNOSTICS | Age: 51
End: 2021-05-24

## 2021-05-25 ENCOUNTER — HOSPITAL ENCOUNTER (OUTPATIENT)
Dept: ULTRASOUND IMAGING | Age: 51
Discharge: HOME | End: 2021-05-25
Attending: INTERNAL MEDICINE
Payer: MEDICAID

## 2021-05-25 ENCOUNTER — HOSPITAL ENCOUNTER (OUTPATIENT)
Dept: NON INVASIVE DIAGNOSTICS | Age: 51
Discharge: HOME | End: 2021-05-25
Attending: INTERNAL MEDICINE
Payer: MEDICAID

## 2021-05-25 DIAGNOSIS — R94.31 ABNORMAL EKG: ICD-10-CM

## 2021-05-25 DIAGNOSIS — R07.89 CHEST PAIN, ATYPICAL: ICD-10-CM

## 2021-05-25 DIAGNOSIS — R06.09 DOE (DYSPNEA ON EXERTION): ICD-10-CM

## 2021-05-25 DIAGNOSIS — R00.2 PALPITATIONS: ICD-10-CM

## 2021-05-25 LAB
ECHO AO ROOT DIAM: 2.66 CM
ECHO AV PEAK GRADIENT: 5.95 MMHG
ECHO AV PEAK VELOCITY: 121.94 CM/S
ECHO EST RA PRESSURE: 10 MMHG
ECHO LA MAJOR AXIS: 2.84 CM
ECHO LV E' SEPTAL VELOCITY: 11.25 CM/S
ECHO LV INTERNAL DIMENSION DIASTOLIC: 4.15 CM (ref 3.9–5.3)
ECHO LV INTERNAL DIMENSION SYSTOLIC: 2.51 CM
ECHO LV IVSD: 0.88 CM (ref 0.6–0.9)
ECHO LV MASS 2D: 110.3 G (ref 67–162)
ECHO LV POSTERIOR WALL DIASTOLIC: 0.85 CM (ref 0.6–0.9)
ECHO LVOT DIAM: 1.89 CM
ECHO MV A VELOCITY: 52.95 CM/S
ECHO MV E DECELERATION TIME (DT): 181.31 MS
ECHO MV E VELOCITY: 89 CM/S
ECHO MV E/A RATIO: 1.68
ECHO MV E/E' SEPTAL: 7.91
ECHO RIGHT VENTRICULAR SYSTOLIC PRESSURE (RVSP): 26.35 MMHG
ECHO TV REGURGITANT MAX VELOCITY: 201.59 CM/S
ECHO TV REGURGITANT PEAK GRADIENT: 16.35 MMHG
STRESS ANGINA INDEX: 0
STRESS BASELINE DIAS BP: 76 MMHG
STRESS BASELINE HR: 65 BPM
STRESS BASELINE SYS BP: 102 MMHG
STRESS ESTIMATED WORKLOAD: 7 METS
STRESS EXERCISE DUR MIN: NORMAL MIN:SEC
STRESS PEAK DIAS BP: 68 MMHG
STRESS PEAK SYS BP: 110 MMHG
STRESS PERCENT HR ACHIEVED: 75 %
STRESS POST PEAK HR: 127 BPM
STRESS RATE PRESSURE PRODUCT: NORMAL BPM*MMHG
STRESS SR DUKE TREADMILL SCORE: 6
STRESS ST DEPRESSION: 0 MM
STRESS ST ELEVATION: 0 MM
STRESS TARGET HR: 170 BPM

## 2021-05-25 PROCEDURE — 93017 CV STRESS TEST TRACING ONLY: CPT

## 2021-05-25 PROCEDURE — 93306 TTE W/DOPPLER COMPLETE: CPT

## 2021-06-08 ENCOUNTER — TELEPHONE (OUTPATIENT)
Dept: CARDIOLOGY CLINIC | Age: 51
End: 2021-06-08

## 2021-06-08 NOTE — TELEPHONE ENCOUNTER
----- Message from Andrae Santana MD sent at 5/25/2021 12:44 PM EDT -----  Regarding: stress treadmill and Echo  Advise echo completely normal.  Stress test stopped early due to dizziness--what we saw looked ok but inadequate HR to make this a diagnostic stress test--would recommend Lexiscan MPI to further evaluate for ischemia if she agrees (if so you can order).   Thanks [facility]

## 2021-06-18 ENCOUNTER — OFFICE VISIT (OUTPATIENT)
Dept: CARDIOLOGY CLINIC | Age: 51
End: 2021-06-18
Payer: MEDICAID

## 2021-06-18 VITALS
BODY MASS INDEX: 23.56 KG/M2 | HEIGHT: 60 IN | DIASTOLIC BLOOD PRESSURE: 70 MMHG | SYSTOLIC BLOOD PRESSURE: 90 MMHG | RESPIRATION RATE: 16 BRPM | HEART RATE: 83 BPM | TEMPERATURE: 98.3 F | OXYGEN SATURATION: 98 % | WEIGHT: 120 LBS

## 2021-06-18 DIAGNOSIS — R07.89 CHEST PAIN, ATYPICAL: Primary | ICD-10-CM

## 2021-06-18 DIAGNOSIS — R06.09 DOE (DYSPNEA ON EXERTION): ICD-10-CM

## 2021-06-18 DIAGNOSIS — R00.2 PALPITATIONS: ICD-10-CM

## 2021-06-18 DIAGNOSIS — R94.31 ABNORMAL EKG: ICD-10-CM

## 2021-06-18 PROCEDURE — 99214 OFFICE O/P EST MOD 30 MIN: CPT | Performed by: INTERNAL MEDICINE

## 2021-06-18 NOTE — PROGRESS NOTES
Molina Ruggiero is a 49 y.o. female is here for f/u to discuss test results. Seen with symptoms of dyspnea on exertion, palpitations. Previously healthy, no prior cardiac hx or meds, seen by PCP for wellness check.  recently passed away. Sx of CLARKE, intermittent palpitations, some pain across back/shoulder blades (more with inspiration). Seen in ER at [address] [place] [address] 3/12/21--EKG normal, CXR normal, labs ok incl troponin. EKG 4/19/21 with sinus lorie, RSR', low voltage precordial leads. Labs with normal CBC, CMP. Lipids mildly abnormal.  Alvarez with Echo 5/25/21 normal--LVEF 65-70, normal valves/chambers/pressures/diastolic. Stress Treadmill EKG 5/25/21 with Og to 6:00,  (only 75% apm), stopped due to fatigue, dizziness. No chest pain or EKG changes--non-diagnostic test due to inadequate HR. Patient Active Problem List    Diagnosis Date Noted    CLARKE (dyspnea on exertion) 05/18/2021    Palpitations 05/18/2021    Abnormal EKG 05/18/2021    Chest pain, atypical 05/18/2021      Bashir Guzmán NP  Past Medical History:   Diagnosis Date    CLARKE (dyspnea on exertion) 5/18/2021    Palpitations 5/18/2021      No past surgical history on file.   No Known Allergies   Family History   Problem Relation Age of Onset    Diabetes Mother     Hypertension Mother    Niecy Downy Arthritis-rheumatoid Father       Social History     Socioeconomic History    Marital status:      Spouse name: Not on file    Number of children: Not on file    Years of education: Not on file    Highest education level: Not on file   Occupational History    Not on file   Tobacco Use    Smoking status: Never Smoker    Smokeless tobacco: Never Used   Vaping Use    Vaping Use: Never used   Substance and Sexual Activity    Alcohol use: Never    Drug use: Never    Sexual activity: Not on file   Other Topics Concern    Not on file   Social History Narrative    Not on file     Social Determinants of Health     Financial Resource Strain:     Difficulty of Paying Living Expenses:    Food Insecurity:     Worried About Running Out of Food in the Last Year:     [address] in the Last Year:    Transportation Needs:     Lack of Transportation (Medical):  Lack of Transportation (Non-Medical):    Physical Activity:     Days of Exercise per Week:     Minutes of Exercise per Session:    Stress:     Feeling of Stress :    Social Connections:     Frequency of Communication with Friends and Family:     Frequency of Social Gatherings with Friends and Family:     Attends [employer] Services:     Active Member of Clubs or Organizations:     Attends Club or Organization Meetings:     Marital Status:    Intimate Partner Violence:     Fear of Current or Ex-Partner:     Emotionally Abused:     Physically Abused:     Sexually Abused:       No current outpatient medications on file. No current facility-administered medications for this visit. Review of Symptoms:    CONST  No weight change. No fever, chills, sweats    ENT No visual changes, URI sx, sore throat    CV  See HPI   RESP  No cough, or sputum, wheezing. Also see HPI   GI  No abdominal pain or change in bowel habits. No heartburn or dysphagia. No melena or rectal bleeding.   No dysuria, urgency, frequency, hematuria   MSKEL  No joint pain, swelling. No muscle pain. SKIN  No rash or lesions. NEURO  No headache, syncope, or seizure. No weakness, loss of sensation, or paresthesias. PSYCH  No low mood or depression  No anxiety. HE/LYMPH  No easy bruising, abnormal bleeding, or enlarged glands. Physical ExamPhysical Exam:    Visit Vitals  Ht 5' (1.524 m)   Wt 120 lb (54.4 kg)   BMI 23.44 kg/m²     Gen: NAD  HEENT:  PERRL, throat clear  Neck: no adenopathy, no thyromegaly, no JVD   Heart:  Regular,Nl S1S2,  no murmur, gallop or rub. Lungs:  clear  Abdomen:   Soft, non-tender, bowel sounds are active.    Extremities:  No edema  Pulse: symmetric  Neuro: A&O times 3, No focal neuro deficits    Labs:   Lab Results   Component Value Date/Time    Sodium 139 04/19/2021 12:42 PM    Potassium 4.1 04/19/2021 12:42 PM    Chloride 106 04/19/2021 12:42 PM    CO2 25 04/19/2021 12:42 PM    Anion gap 8 04/19/2021 12:42 PM    Glucose 90 04/19/2021 12:42 PM    BUN 10 04/19/2021 12:42 PM    Creatinine 0.58 04/19/2021 12:42 PM    BUN/Creatinine ratio 17 04/19/2021 12:42 PM    GFR est AA >60 04/19/2021 12:42 PM    GFR est non-AA >60 04/19/2021 12:42 PM    Calcium 8.9 04/19/2021 12:42 PM    Bilirubin, total 0.3 04/19/2021 12:42 PM    Alk. phosphatase 75 04/19/2021 12:42 PM    Protein, total 7.6 04/19/2021 12:42 PM    Albumin 4.3 04/19/2021 12:42 PM    Globulin 3.3 04/19/2021 12:42 PM    A-G Ratio 1.3 04/19/2021 12:42 PM    ALT (SGPT) 19 04/19/2021 12:42 PM     No results found for: CPK, CPKX, CPX  Lab Results   Component Value Date/Time    Cholesterol, total 219 (H) 04/19/2021 12:42 PM    HDL Cholesterol 59 04/19/2021 12:42 PM    LDL, calculated 127 (H) 04/19/2021 12:42 PM    Triglyceride 165 (H) 04/19/2021 12:42 PM    CHOL/HDL Ratio 3.7 04/19/2021 12:42 PM     No results found for this or any previous visit. Assessment:         Patient Active Problem List    Diagnosis Date Noted    CLARKE (dyspnea on exertion) 05/18/2021    Palpitations 05/18/2021    Abnormal EKG 05/18/2021    Chest pain, atypical 05/18/2021     49 y.o. female is here for f/u to discuss test results. Seen with symptoms of dyspnea on exertion, palpitations. Previously healthy, no prior cardiac hx or meds, seen by PCP for wellness check.  recently passed away. Sx of CLARKE, intermittent palpitations, some pain across back/shoulder blades (more with inspiration). Seen in ER at a [facility] 3/12/21--EKG normal, CXR normal, labs ok incl troponin. EKG 4/19/21 with sinus lorie, RSR', low voltage precordial leads. Labs with normal CBC, CMP.   Lipids mildly abnormal.  Alvarez with Echo 5/25/21 normal--LVEF 65-70, normal valves/chambers/pressures/diastolic. Stress Treadmill EKG 5/25/21 with Og to 6:00,  (only 75% apm), stopped due to fatigue, dizziness. No chest pain or EKG changes--non-diagnostic test due to inadequate HR.      Plan:     Sx improved, likely all stress related/emotional due to loss of /grieving, etc  Discussed Echo and Stress Testing--she preferred to hold off on additional testing at this point (eg Lexiscan stress MPI as inadequate HR)--will let us know   Will fu with PCP  Fu here on prn basis    Rosa Marlow MD

## 2021-06-18 NOTE — PROGRESS NOTES
Identified pt with two pt identifiers(name and ). Reviewed record in preparation for visit and have obtained necessary documentation. Chief Complaint   Patient presents with    Results     Go over echo & ekg stress test results      Vitals:    21 1356   BP: 90/70   Pulse: 83   Resp: 16   Temp: 98.3 °F (36.8 °C)   TempSrc: Temporal   SpO2: 98%   Weight: 120 lb (54.4 kg)   Height: 5' (1.524 m)   PainSc:   0 - No pain       Medications reviewed/approved by Dr. Tavon Griffith. Health Maintenance Review: Patient reminded of \"due or due soon\" health maintenance. I have asked the patient to contact his/her primary care provider (PCP) for follow-up on his/her health maintenance. Coordination of Care Questionnaire:  :   1) Have you been to an emergency room, urgent care, or hospitalized since your last visit? If yes, where when, and reason for visit? no       2. Have seen or consulted any other health care provider since your last visit? If yes, where when, and reason for visit? NO      Patient is accompanied by self I have received verbal consent from Maryana Lam to discuss any/all medical information while they are present in the room.

## 2021-10-26 ENCOUNTER — HOSPITAL ENCOUNTER (OUTPATIENT)
Dept: LAB | Age: 51
Discharge: HOME | End: 2021-10-26

## 2021-10-26 PROCEDURE — 83001 ASSAY OF GONADOTROPIN (FSH): CPT

## 2021-10-26 PROCEDURE — 83036 HEMOGLOBIN GLYCOSYLATED A1C: CPT

## 2021-10-26 PROCEDURE — 84443 ASSAY THYROID STIM HORMONE: CPT

## 2021-10-26 PROCEDURE — 80061 LIPID PANEL: CPT

## 2021-10-26 PROCEDURE — 85025 COMPLETE CBC W/AUTO DIFF WBC: CPT

## 2021-10-26 PROCEDURE — 86677 HELICOBACTER PYLORI ANTIBODY: CPT

## 2021-10-26 PROCEDURE — 80053 COMPREHEN METABOLIC PANEL: CPT

## 2021-10-27 LAB
ALBUMIN SERPL-MCNC: 3.9 G/DL (ref 3.5–5)
ALBUMIN/GLOB SERPL: 1.3 {RATIO} (ref 1.1–2.2)
ALP SERPL-CCNC: 86 U/L (ref 45–117)
ALT SERPL-CCNC: 26 U/L (ref 12–78)
ANION GAP SERPL CALC-SCNC: 8 MMOL/L (ref 5–15)
AST SERPL-CCNC: 14 U/L (ref 15–37)
BASOPHILS # BLD: 0 K/UL (ref 0–0.1)
BASOPHILS NFR BLD: 1 % (ref 0–1)
BILIRUB SERPL-MCNC: 0.4 MG/DL (ref 0.2–1)
BUN SERPL-MCNC: 12 MG/DL (ref 6–20)
BUN/CREAT SERPL: 19 (ref 12–20)
CALCIUM SERPL-MCNC: 8.8 MG/DL (ref 8.5–10.1)
CHLORIDE SERPL-SCNC: 103 MMOL/L (ref 97–108)
CHOLEST SERPL-MCNC: 188 MG/DL
CO2 SERPL-SCNC: 29 MMOL/L (ref 21–32)
CREAT SERPL-MCNC: 0.62 MG/DL (ref 0.55–1.02)
DIFFERENTIAL METHOD BLD: ABNORMAL
EOSINOPHIL # BLD: 0 K/UL (ref 0–0.4)
EOSINOPHIL NFR BLD: 1 % (ref 0–7)
ERYTHROCYTE [DISTWIDTH] IN BLOOD BY AUTOMATED COUNT: 13.6 % (ref 11.5–14.5)
EST. AVERAGE GLUCOSE BLD GHB EST-MCNC: 123 MG/DL
FSH SERPL-ACNC: 62.9 MIU/ML
GLOBULIN SER CALC-MCNC: 3 G/DL (ref 2–4)
GLUCOSE SERPL-MCNC: 94 MG/DL (ref 65–100)
HBA1C MFR BLD: 5.9 % (ref 4–5.6)
HCT VFR BLD AUTO: 34.2 % (ref 35–47)
HDLC SERPL-MCNC: 47 MG/DL
HDLC SERPL: 4 {RATIO} (ref 0–5)
HGB BLD-MCNC: 10.9 G/DL (ref 11.5–16)
IMM GRANULOCYTES # BLD AUTO: 0 K/UL (ref 0–0.04)
IMM GRANULOCYTES NFR BLD AUTO: 0 % (ref 0–0.5)
LDLC SERPL CALC-MCNC: 129.4 MG/DL (ref 0–100)
LH SERPL-ACNC: 25.6 MIU/ML
LYMPHOCYTES # BLD: 1.6 K/UL (ref 0.8–3.5)
LYMPHOCYTES NFR BLD: 36 % (ref 12–49)
MCH RBC QN AUTO: 27.7 PG (ref 26–34)
MCHC RBC AUTO-ENTMCNC: 31.9 G/DL (ref 30–36.5)
MCV RBC AUTO: 86.8 FL (ref 80–99)
MONOCYTES # BLD: 0.2 K/UL (ref 0–1)
MONOCYTES NFR BLD: 5 % (ref 5–13)
NEUTS SEG # BLD: 2.5 K/UL (ref 1.8–8)
NEUTS SEG NFR BLD: 57 % (ref 32–75)
NRBC # BLD: 0 K/UL (ref 0–0.01)
NRBC BLD-RTO: 0 PER 100 WBC
PLATELET # BLD AUTO: 278 K/UL (ref 150–400)
PMV BLD AUTO: 10.1 FL (ref 8.9–12.9)
POTASSIUM SERPL-SCNC: 4.7 MMOL/L (ref 3.5–5.1)
PROT SERPL-MCNC: 6.9 G/DL (ref 6.4–8.2)
RBC # BLD AUTO: 3.94 M/UL (ref 3.8–5.2)
SODIUM SERPL-SCNC: 140 MMOL/L (ref 136–145)
TRIGL SERPL-MCNC: 58 MG/DL (ref ?–150)
TSH SERPL DL<=0.05 MIU/L-ACNC: 0.95 UIU/ML (ref 0.36–3.74)
VLDLC SERPL CALC-MCNC: 11.6 MG/DL
WBC # BLD AUTO: 4.4 K/UL (ref 3.6–11)

## 2021-10-28 LAB — H PYLORI IGA SER-ACNC: <9 UNITS (ref 0–8.9)

## 2022-03-18 PROBLEM — R94.31 ABNORMAL EKG: Status: ACTIVE | Noted: 2021-05-18

## 2022-03-19 PROBLEM — R00.2 PALPITATIONS: Status: ACTIVE | Noted: 2021-05-18

## 2022-03-19 PROBLEM — R07.89 CHEST PAIN, ATYPICAL: Status: ACTIVE | Noted: 2021-05-18

## 2022-03-19 PROBLEM — R06.09 DOE (DYSPNEA ON EXERTION): Status: ACTIVE | Noted: 2021-05-18

## 2022-05-16 ENCOUNTER — OFFICE VISIT (OUTPATIENT)
Dept: FAMILY MEDICINE CLINIC | Age: 52
End: 2022-05-16
Payer: MEDICAID

## 2022-05-16 VITALS
WEIGHT: 119.8 LBS | OXYGEN SATURATION: 98 % | RESPIRATION RATE: 20 BRPM | HEIGHT: 60 IN | BODY MASS INDEX: 23.52 KG/M2 | SYSTOLIC BLOOD PRESSURE: 102 MMHG | DIASTOLIC BLOOD PRESSURE: 60 MMHG | TEMPERATURE: 97.3 F | HEART RATE: 74 BPM

## 2022-05-16 DIAGNOSIS — F43.81 PROLONGED GRIEF REACTION: ICD-10-CM

## 2022-05-16 DIAGNOSIS — R53.83 FATIGUE, UNSPECIFIED TYPE: ICD-10-CM

## 2022-05-16 DIAGNOSIS — R07.1 CHEST PAIN ON BREATHING: ICD-10-CM

## 2022-05-16 DIAGNOSIS — E55.9 VITAMIN D DEFICIENCY: ICD-10-CM

## 2022-05-16 DIAGNOSIS — E78.2 MIXED HYPERLIPIDEMIA: ICD-10-CM

## 2022-05-16 DIAGNOSIS — R73.03 PREDIABETES: Primary | ICD-10-CM

## 2022-05-16 PROCEDURE — 99214 OFFICE O/P EST MOD 30 MIN: CPT

## 2022-05-16 NOTE — PATIENT INSTRUCTIONS
Grief (Actual/Anticipated): Care Instructions  Overview     Grief is an emotional and physical reaction to a major loss. The words \"sorrow\" and \"heartache\" often are used to describe feelings of grief. You may feel grief when you lose a beloved person, pet, place, or thing. It is also natural to feel grief when you lose a valued way of life, such as a job, marriage, or good health. You may begin to grieve before a loss occurs. You may grieve for a loved one who is sick and dying. Children and adults often feel the pain of loss before a big move or divorce. Grief is different for each person. There is no \"normal\" or \"expected\" period of time for grieving. Grieving can cause problems such as headaches, loss of appetite, and trouble with thinking or sleeping. You may withdraw from friends and family and behave in ways that are unusual for you. Grief may cause you to question your beliefs and views about life. Grief is natural and does not require medical treatment. It may help to talk with people who have been through or are going through similar losses. You may also want to talk to a counselor about your feelings. Talking about your loss, sharing your cares and concerns, and getting support from others are important parts of healthy grieving. Follow-up care is a key part of your treatment and safety. Be sure to make and go to all appointments, and call your doctor if you are having problems. It's also a good idea to know your test results and keep a list of the medicines you take. How can you care for yourself at home? · Get enough sleep. Missing sleep may make it harder for you to cope with your grief. · Eat healthy foods. Ask someone to join you for a meal if you don't like eating alone. · Get some exercise every day. Even a walk can help you deal with your grief. Other exercises, such as yoga, may also help you manage stress. · Stay involved in your life. Don't withdraw from the activities you enjoy. People you know at work, Buddhism, clubs, or other groups can help you. · Comfort yourself. Familiar surroundings and special items, such as photos or a loved one's favorite shirt, may give you comfort. · Think about joining a support group. When should you call for help? Call 911 anytime you think you may need emergency care. For example, call if:    · You feel you cannot stop from hurting yourself or someone else. Watch closely for changes in your health, and be sure to contact your doctor if:    · You think you may be depressed.     · You do not get better as expected. Where can you learn more? Go to http://www.gray.com/  Enter H249 in the search box to learn more about \"Grief (Actual/Anticipated): Care Instructions. \"  Current as of: October 18, 2021               Content Version: 13.2  © 2006-2022 [employer] Care instructions adapted under license by [employer] (which disclaims liability or warranty for this information). If you have questions about a medical condition or this instruction, always ask your healthcare professional. [place] [address] any warranty or liability for your use of this information.

## 2022-05-16 NOTE — PROGRESS NOTES
1. \"Have you been to the ER, urgent care clinic since your last visit? Hospitalized since your last visit? \" No    2. \"Have you seen or consulted any other health care providers outside of the [address] [place] [address] since your last visit? \" No     3. For patients aged 39-70: Has the patient had a colonoscopy / FIT/ Cologuard? No      If the patient is female:    4. For patients aged 41-77: Has the patient had a mammogram within the past 2 years? No      5. For patients aged 21-65: Has the patient had a pap smear?  No

## 2022-05-16 NOTE — PROGRESS NOTES
Chief Complaint   Patient presents with    [facility]     NTP    Physical     routine & feeling a burning sensation in chest area and under left breast    Fatigue     with SOB       HPI:     is a 47 y.o. female who presents for a check-up. She lives locally and is recently . She typically enjoys good health, but notes that she has intermittent \"hot\" sensation in her chest and under her left breast for the last several months; this occurs every 3-6 weeks and lasts for a few minutes or few days. She notes that this sensation is usually accompanied with SOB, worse if she wears a mask; there is no fever, chills, cough, or cold sx. The most recent episode occurred about 4 days ago and she took an [address] (used in [place] as an antiviral) without much relief. She had CP about 1 year ago for which she underwent a stress test, but this does not feel similar to that. She reports ongoing issues with grief; she admits that she actively avoids things that remind her of her . Her  always prepared their meals, and now she sometimes forgets to eat. She reports that she no longer feels tearful or unable to control her emotions and denies depression or anxiety. She has tried to talk to her 's therapist colleagues, but has not found this helpful; she does not want to take medication. No Known Allergies    No current outpatient medications on file. No current facility-administered medications for this visit. Past Medical History:   Diagnosis Date    CLARKE (dyspnea on exertion) 5/18/2021    Palpitations 5/18/2021       Family History   Problem Relation Age of Onset    Diabetes Mother     Hypertension Mother    Melville Mahendra Arthritis-rheumatoid Father          Review of Systems   Constitutional: Positive for malaise/fatigue. Negative for chills and fever. HENT: Negative. Eyes: Negative.     Respiratory: Positive for shortness of breath. Negative for cough. Cardiovascular: Positive for chest pain. Negative for palpitations and leg swelling. Gastrointestinal: Negative. Negative for abdominal pain, nausea and vomiting. Genitourinary: Negative. Musculoskeletal: Negative. Skin: Negative. Neurological: Negative. Negative for dizziness and headaches. Endo/Heme/Allergies: Negative. Psychiatric/Behavioral: Negative. Negative for depression and suicidal ideas. The patient is not nervous/anxious and does not have insomnia. /60 (BP 1 Location: Left arm, BP Patient Position: Sitting, BP Cuff Size: Small adult)   Pulse 74   Temp 97.3 °F (36.3 °C) (Core)   Resp 20   Ht 4' 11.75\" (1.518 m)   Wt 119 lb 12.8 oz (54.3 kg)   LMP 05/06/2022   SpO2 98%   BMI 23.59 kg/m²     Wt Readings from Last 3 Encounters:   05/16/22 119 lb 12.8 oz (54.3 kg)   06/18/21 120 lb (54.4 kg)   05/18/21 124 lb (56.2 kg)       3 most recent PHQ Screens 5/16/2022   Little interest or pleasure in doing things Not at all   Feeling down, depressed, irritable, or hopeless Not at all   Total Score PHQ 2 0       Physical Exam  Vitals and nursing note reviewed. Constitutional:       General: She is not in acute distress. Appearance: Normal appearance. She is normal weight. HENT:      Head: Normocephalic and atraumatic. Right Ear: Tympanic membrane, ear canal and external ear normal.      Left Ear: Tympanic membrane, ear canal and external ear normal.      Mouth/Throat:      Mouth: Mucous membranes are moist.      Pharynx: Oropharynx is clear. Eyes:      Extraocular Movements: Extraocular movements intact. Conjunctiva/sclera: Conjunctivae normal.      Pupils: Pupils are equal, round, and reactive to light. Neck:      Vascular: No carotid bruit. Cardiovascular:      Rate and Rhythm: Normal rate and regular rhythm. Pulses: Normal pulses. Heart sounds: Normal heart sounds. No murmur heard. No friction rub.  No gallop. Pulmonary:      Effort: Pulmonary effort is normal.      Breath sounds: Normal breath sounds. No wheezing, rhonchi or rales. Abdominal:      General: Abdomen is flat. Bowel sounds are normal.      Palpations: Abdomen is soft. Musculoskeletal:         General: Normal range of motion. Cervical back: Normal range of motion and neck supple. Lymphadenopathy:      Cervical: No cervical adenopathy. Skin:     General: Skin is warm and dry. Neurological:      General: No focal deficit present. Mental Status: She is alert and oriented to person, place, and time. Mental status is at baseline. Psychiatric:         Mood and Affect: Mood normal.         Behavior: Behavior normal.         Thought Content: Thought content normal.         Judgment: Judgment normal.       ASSESSMENT AND PLAN:       ICD-10-CM ICD-9-CM    1. Prediabetes  R73.03 790.29 COLLECTION VENOUS BLOOD,VENIPUNCTURE      METABOLIC PANEL, COMPREHENSIVE      METABOLIC PANEL, COMPREHENSIVE   2. Fatigue, unspecified type  R53.83 780.79 COLLECTION VENOUS BLOOD,VENIPUNCTURE      CBC WITH AUTOMATED DIFF      TSH 3RD GENERATION      CBC WITH AUTOMATED DIFF      TSH 3RD GENERATION   3. Mixed hyperlipidemia  E78.2 272.2 COLLECTION VENOUS BLOOD,VENIPUNCTURE      LIPID PANEL      LIPID PANEL   4. Vitamin D deficiency  E55.9 268.9 COLLECTION VENOUS BLOOD,VENIPUNCTURE      VITAMIN D, 25 HYDROXY      VITAMIN D, 25 HYDROXY   5. Chest pain on breathing  R07.1 786.52 AMB POC EKG ROUTINE W/ 12 LEADS, INTER & REP   6. Prolonged grief reaction  F43.29 309.1        Check labs today. EKG: normal EKG, normal sinus rhythm, unchanged from previous tracings. Consider CXR, referral back to cardiology if symptoms continue. Recommend talk therapy for prolonged grief; she will consider this.   Discussed tenets of healthy lifestyle--heart healthy diet, eat whole grains, lean meats, and plenty of fruits and veggies, avoid concentrated sweets and saturated fats; 30 minutes of moderately intense exercise most days of the week; avoid tobacco and illicit drugs, limit EtOH; practice safe sex. Medication Side Effects and Warnings were discussed with patient:  yes  Patient Labs were reviewed:  yes  Patient Past Records were reviewed:  yes      Patient aware of plan of care and verbalized understanding. Questions answered. RTC 1 month PRN or sooner if needed. On this date 05/16/2022 I have spent 30 minutes reviewing previous notes, test results and face to face with the patient discussing the diagnosis and importance of compliance with the treatment plan as well as documenting on the day of the visit.     Priti Yanes, PARUL

## 2022-05-17 DIAGNOSIS — E55.9 VITAMIN D DEFICIENCY: Primary | ICD-10-CM

## 2022-05-17 LAB
25(OH)D3+25(OH)D2 SERPL-MCNC: 15.4 NG/ML (ref 30–100)
ALBUMIN SERPL-MCNC: 4.4 G/DL (ref 3.8–4.9)
ALBUMIN/GLOB SERPL: 1.8 {RATIO} (ref 1.2–2.2)
ALP SERPL-CCNC: 71 IU/L (ref 44–121)
ALT SERPL-CCNC: 14 IU/L (ref 0–32)
AST SERPL-CCNC: 18 IU/L (ref 0–40)
BASOPHILS # BLD AUTO: 0.1 X10E3/UL (ref 0–0.2)
BASOPHILS NFR BLD AUTO: 1 %
BILIRUB SERPL-MCNC: 0.3 MG/DL (ref 0–1.2)
BUN SERPL-MCNC: 12 MG/DL (ref 6–24)
BUN/CREAT SERPL: 24 (ref 9–23)
CALCIUM SERPL-MCNC: 8.9 MG/DL (ref 8.7–10.2)
CHLORIDE SERPL-SCNC: 101 MMOL/L (ref 96–106)
CHOLEST SERPL-MCNC: 263 MG/DL (ref 100–199)
CO2 SERPL-SCNC: 24 MMOL/L (ref 20–29)
CREAT SERPL-MCNC: 0.5 MG/DL (ref 0.57–1)
EGFR: 113 ML/MIN/1.73
EOSINOPHIL # BLD AUTO: 0.1 X10E3/UL (ref 0–0.4)
EOSINOPHIL NFR BLD AUTO: 1 %
ERYTHROCYTE [DISTWIDTH] IN BLOOD BY AUTOMATED COUNT: 13.5 % (ref 11.7–15.4)
GLOBULIN SER CALC-MCNC: 2.5 G/DL (ref 1.5–4.5)
GLUCOSE SERPL-MCNC: 98 MG/DL (ref 65–99)
HCT VFR BLD AUTO: 38.9 % (ref 34–46.6)
HDLC SERPL-MCNC: 45 MG/DL
HGB BLD-MCNC: 12.3 G/DL (ref 11.1–15.9)
IMM GRANULOCYTES # BLD AUTO: 0 X10E3/UL (ref 0–0.1)
IMM GRANULOCYTES NFR BLD AUTO: 0 %
LDLC SERPL CALC-MCNC: 160 MG/DL (ref 0–99)
LYMPHOCYTES # BLD AUTO: 2.1 X10E3/UL (ref 0.7–3.1)
LYMPHOCYTES NFR BLD AUTO: 30 %
MCH RBC QN AUTO: 28 PG (ref 26.6–33)
MCHC RBC AUTO-ENTMCNC: 31.6 G/DL (ref 31.5–35.7)
MCV RBC AUTO: 89 FL (ref 79–97)
MONOCYTES # BLD AUTO: 0.4 X10E3/UL (ref 0.1–0.9)
MONOCYTES NFR BLD AUTO: 6 %
NEUTROPHILS # BLD AUTO: 4.3 X10E3/UL (ref 1.4–7)
NEUTROPHILS NFR BLD AUTO: 62 %
PLATELET # BLD AUTO: 305 X10E3/UL (ref 150–450)
POTASSIUM SERPL-SCNC: 4.3 MMOL/L (ref 3.5–5.2)
PROT SERPL-MCNC: 6.9 G/DL (ref 6–8.5)
RBC # BLD AUTO: 4.39 X10E6/UL (ref 3.77–5.28)
SODIUM SERPL-SCNC: 138 MMOL/L (ref 134–144)
TRIGL SERPL-MCNC: 307 MG/DL (ref 0–149)
TSH SERPL DL<=0.005 MIU/L-ACNC: 2.27 UIU/ML (ref 0.45–4.5)
VLDLC SERPL CALC-MCNC: 58 MG/DL (ref 5–40)
WBC # BLD AUTO: 6.9 X10E3/UL (ref 3.4–10.8)

## 2022-05-17 RX ORDER — ERGOCALCIFEROL 1.25 MG/1
50000 CAPSULE ORAL
Qty: 12 CAPSULE | Refills: 0 | Status: SHIPPED | OUTPATIENT
Start: 2022-05-17

## 2022-05-17 NOTE — PROGRESS NOTES
Your cholesterol levels are elevated; I recommend that you follow a heart healthy diet, eat whole grains, lean meats, and plenty of fruits and veggies--avoiding concentrated sweets and saturated fats. Your vitamin D level is quite low; I recommend a prescription vitamin D supplement that you take once a week for 12 weeks--I will send this to [facility] for you. No concerns with your other labs; normal thyroid, kidney, liver, electrolytes, and blood counts.

## 2022-05-17 NOTE — PROGRESS NOTES
Patient verified stating name and date of birth. Patient informed of lab results and states understanding.  Mailed results per patients request

## 2022-09-21 ENCOUNTER — OFFICE VISIT (OUTPATIENT)
Dept: FAMILY MEDICINE CLINIC | Age: 52
End: 2022-09-21
Payer: MEDICAID

## 2022-09-21 VITALS
HEART RATE: 71 BPM | OXYGEN SATURATION: 99 % | RESPIRATION RATE: 16 BRPM | TEMPERATURE: 98.3 F | SYSTOLIC BLOOD PRESSURE: 96 MMHG | DIASTOLIC BLOOD PRESSURE: 60 MMHG | BODY MASS INDEX: 24.35 KG/M2 | HEIGHT: 60 IN | WEIGHT: 124 LBS

## 2022-09-21 DIAGNOSIS — T14.8XXA ABRASION: ICD-10-CM

## 2022-09-21 DIAGNOSIS — Z12.11 COLON CANCER SCREENING: ICD-10-CM

## 2022-09-21 DIAGNOSIS — Z23 ENCOUNTER FOR IMMUNIZATION: Primary | ICD-10-CM

## 2022-09-21 PROCEDURE — 99213 OFFICE O/P EST LOW 20 MIN: CPT | Performed by: NURSE PRACTITIONER

## 2022-09-21 PROCEDURE — 90714 TD VACC NO PRESV 7 YRS+ IM: CPT | Performed by: NURSE PRACTITIONER

## 2022-09-21 NOTE — PROGRESS NOTES
Vaccine updated. Td given lot # [id number] 5- Left deltoid  Denies former reactions to shot and any allergies to chicken eggs or products.

## 2022-09-21 NOTE — PROGRESS NOTES
Chief Complaint   Patient presents with    Wound Check     Lawence Ravi wire injury to left hand Sunday    1. \"Have you been to the ER, urgent care clinic since your last visit? Hospitalized since your last visit? \" No    2. \"Have you seen or consulted any other health care providers outside of the [address] [place] [address] since your last visit? \" No     3. For patients aged 39-70: Has the patient had a colonoscopy / FIT/ Cologuard? Yes - Care Gap present. Most recent result on file      If the patient is female:    4. For patients aged 41-77: Has the patient had a mammogram within the past 2 years? Yes - no Care Gap present      5. For patients aged 21-65: Has the patient had a pap smear?  Yes - no Care Gap present

## 2022-09-21 NOTE — ACP (ADVANCE CARE PLANNING)
Discussed importance of advanced medical directives with patient. Patient is capable of making decisions.  Jeramy WOODC

## 2022-09-21 NOTE — PROGRESS NOTES
Subjective:      Mauri Nickerson is a 47 y.o. female who presents today for wound check. She has an abrasion wound which is located on the Left dorsal hand. She endorses that the wound occurred as the result of an encounter with barbed wire while gardening Sunday. Concern with last Tetanus shot 13 yr ago. Current symptoms: tenderness, mild erythema    Interventions to date: She endorses that she has cleansed the wound with alcohol solution and kept it covered with a bandaid. dressing changed this visit. Objective:     Visit Vitals  BP 96/60 (BP 1 Location: Left upper arm, BP Patient Position: Sitting, BP Cuff Size: Adult)   Pulse 71   Temp 98.3 °F (36.8 °C) (Temporal)   Resp 16   Ht 4' 11.5\" (1.511 m)   Wt 124 lb (56.2 kg)   SpO2 99%   BMI 24.63 kg/m²       Wound:   Wound extending approximately 1.5 cm with central skin shearing. Margins intact and healing. Wound appears moist without exudate. Erythema and tenderness extending along margins. Assessment:       ICD-10-CM ICD-9-CM    1. Encounter for immunization  Z23 V03.89 TETANUS AND DIPHTHERIA TOXOIDS (TD) ADSORBED, PRES. FREE, IN INDIVIDS. >=7, IM      2. Abrasion  T14. 8XXA 919.0 TETANUS AND DIPHTHERIA TOXOIDS (TD) ADSORBED, PRES. FREE, IN INDIVIDS. >=7, IM         Wound check. Interventions today visual inspection  cleansing with 0.9% Saline solution  application of topical Bacitracin. application of clean dressing. Plan:     Orders Placed This Encounter    Tetanus and Diphtheria Toxoids (TD)       1. Discussed appropriate home care of this wound. , Wound redressed.,   2. Patient instructions were given; Will change dressing again tomorrow. Apply Bacitracin ointment (provided). Keep covered for 2 days. Then may allow to heal open to air and cover when performing activities that may contaminate wound. 3. Last Tetanus vaccination 13 yr ago. Tetanus booster vaccine administered.   4. Follow up: if no improvement or worsening of condition.     Wesley Suarez NP student Mayda Dunn NP-C

## 2022-09-26 LAB — HEMOCCULT STL QL IA: NEGATIVE

## 2023-08-17 ENCOUNTER — NURSE TRIAGE (OUTPATIENT)
Dept: OTHER | Facility: CLINIC | Age: 53
End: 2023-08-17

## 2023-08-17 NOTE — TELEPHONE ENCOUNTER
[facility]     [facility]   ID: [id number]    Received call from RAYNA at [facility] with [employer] Complaint. Subjective: Caller states \"heart palpitations and troubling breathing. \"     Current Symptoms:   Feeling Weak  Fatigue  Heart racing    Onset: 6 months ago; unchanged    Associated Symptoms: reduced activity    Pain Severity: Denies pain    Temperature: Denies fever    LMP:  Unknown  Pregnant: Unknown    Recommended disposition: See in Office Within 2 Weeks    Patient has been seen in office for these symptoms in the past.    Care advice provided, patient verbalizes understanding; denies any other questions or concerns; instructed to call back for any new or worsening symptoms. Patient/Caller agrees with recommended disposition; writer provided warm transfer to [place] at [facility] for appointment scheduling    Attention Provider: Thank you for allowing me to participate in the care of your patient. The patient was connected to triage in response to information provided to the ECC/PSC. Please do not respond through this encounter as the response is not directed to a shared pool.     Reason for Disposition   Palpitations are a chronic symptom (recurrent or ongoing AND present > 4 weeks)    Protocols used: Heart Rate and Heartbeat Questions-ADULT-

## 2023-08-21 ENCOUNTER — OFFICE VISIT (OUTPATIENT)
Age: 53
End: 2023-08-21
Payer: MEDICAID

## 2023-08-21 VITALS
OXYGEN SATURATION: 98 % | SYSTOLIC BLOOD PRESSURE: 108 MMHG | WEIGHT: 119.8 LBS | HEART RATE: 56 BPM | RESPIRATION RATE: 16 BRPM | HEIGHT: 60 IN | TEMPERATURE: 97.7 F | BODY MASS INDEX: 23.52 KG/M2 | DIASTOLIC BLOOD PRESSURE: 70 MMHG

## 2023-08-21 DIAGNOSIS — R07.89 CHEST PAIN, ATYPICAL: ICD-10-CM

## 2023-08-21 DIAGNOSIS — R00.2 PALPITATIONS: Primary | ICD-10-CM

## 2023-08-21 DIAGNOSIS — R73.03 PREDIABETES: ICD-10-CM

## 2023-08-21 DIAGNOSIS — R10.9 STOMACH PAIN: ICD-10-CM

## 2023-08-21 DIAGNOSIS — E55.9 VITAMIN D DEFICIENCY, UNSPECIFIED: ICD-10-CM

## 2023-08-21 PROCEDURE — 99214 OFFICE O/P EST MOD 30 MIN: CPT | Performed by: NURSE PRACTITIONER

## 2023-08-21 PROCEDURE — 36415 COLL VENOUS BLD VENIPUNCTURE: CPT | Performed by: NURSE PRACTITIONER

## 2023-08-21 PROCEDURE — 93000 ELECTROCARDIOGRAM COMPLETE: CPT | Performed by: NURSE PRACTITIONER

## 2023-08-21 SDOH — ECONOMIC STABILITY: INCOME INSECURITY: IN THE LAST 12 MONTHS, WAS THERE A TIME WHEN YOU WERE NOT ABLE TO PAY THE MORTGAGE OR RENT ON TIME?: NO

## 2023-08-21 SDOH — ECONOMIC STABILITY: HOUSING INSECURITY: IN THE LAST 12 MONTHS, HOW MANY PLACES HAVE YOU LIVED?: 1

## 2023-08-21 SDOH — ECONOMIC STABILITY: HOUSING INSECURITY
IN THE LAST 12 MONTHS, WAS THERE A TIME WHEN YOU DID NOT HAVE A STEADY PLACE TO SLEEP OR SLEPT IN A SHELTER (INCLUDING NOW)?: NO

## 2023-08-21 SDOH — HEALTH STABILITY: PHYSICAL HEALTH: ON AVERAGE, HOW MANY DAYS PER WEEK DO YOU ENGAGE IN MODERATE TO STRENUOUS EXERCISE (LIKE A BRISK WALK)?: 0 DAYS

## 2023-08-21 SDOH — HEALTH STABILITY: PHYSICAL HEALTH: ON AVERAGE, HOW MANY MINUTES DO YOU ENGAGE IN EXERCISE AT THIS LEVEL?: 0 MIN

## 2023-08-21 ASSESSMENT — PATIENT HEALTH QUESTIONNAIRE - PHQ9
SUM OF ALL RESPONSES TO PHQ QUESTIONS 1-9: 0
1. LITTLE INTEREST OR PLEASURE IN DOING THINGS: 0
SUM OF ALL RESPONSES TO PHQ QUESTIONS 1-9: 0
DEPRESSION UNABLE TO ASSESS: PT REFUSES

## 2023-08-21 ASSESSMENT — SOCIAL DETERMINANTS OF HEALTH (SDOH)
HOW OFTEN DO YOU ATTEND CHURCH OR RELIGIOUS SERVICES?: PATIENT DECLINED
WITHIN THE LAST YEAR, HAVE YOU BEEN HUMILIATED OR EMOTIONALLY ABUSED IN OTHER WAYS BY YOUR PARTNER OR EX-PARTNER?: NO
WITHIN THE LAST YEAR, HAVE YOU BEEN AFRAID OF YOUR PARTNER OR EX-PARTNER?: NO
HOW OFTEN DO YOU ATTENT MEETINGS OF THE CLUB OR ORGANIZATION YOU BELONG TO?: NEVER
WITHIN THE LAST YEAR, HAVE TO BEEN RAPED OR FORCED TO HAVE ANY KIND OF SEXUAL ACTIVITY BY YOUR PARTNER OR EX-PARTNER?: NO
IN A TYPICAL WEEK, HOW MANY TIMES DO YOU TALK ON THE PHONE WITH FAMILY, FRIENDS, OR NEIGHBORS?: MORE THAN THREE TIMES A WEEK
WITHIN THE LAST YEAR, HAVE YOU BEEN KICKED, HIT, SLAPPED, OR OTHERWISE PHYSICALLY HURT BY YOUR PARTNER OR EX-PARTNER?: NO
HOW OFTEN DO YOU GET TOGETHER WITH FRIENDS OR RELATIVES?: MORE THAN THREE TIMES A WEEK

## 2023-08-21 ASSESSMENT — LIFESTYLE VARIABLES
HOW OFTEN DO YOU HAVE A DRINK CONTAINING ALCOHOL: NEVER
HOW MANY STANDARD DRINKS CONTAINING ALCOHOL DO YOU HAVE ON A TYPICAL DAY: PATIENT DOES NOT DRINK

## 2023-08-21 NOTE — PROGRESS NOTES
Chief Complaint   Patient presents with    Palpitations      Subjective:     Selvin Lepe is a 47 y.o. female who presents today with the following:  Chief Complaint   Patient presents with    Palpitations       Patient Active Problem List   Diagnosis    Abnormal EKG    Chest pain, atypical    Palpitations    GUARDADO (dyspnea on exertion)         COMPLIANT WITH MEDICATION:   HTN; Denies chest pain, dyspnea, palpitations, headache and blurred vision. Blood pressure normotensive. Abdominal pain generalized intermittent dull ache resolves on its own. She denies any precipitating factors, She denies nv/d/acid reflux     depression screening addressed not at risk    abuse screening addressed denies    learning assessment addressed reviewed nurses notes    fall risk addressed not at risk    HM: addressed    ROS:  Gen: denies fever, chills, fatigue, weight loss, weight gain  HEENT:denies blurry vision, nasal congestion, sore throat  Resp: denies dypsnea, cough, wheezing  CV: denies chest pain radiating to the jaws or arms, palpitations, lower extremity edema  Abd: denies nausea, vomiting, diarrhea, constipation  Neuro: denies numbness/tingling  Endo: denies polyuria, polydipsia, heat/cold intolerance  Heme: no lymphadenopathy    No Known Allergies      Current Outpatient Medications:     ergocalciferol (ERGOCALCIFEROL) 1.25 MG (78538 UT) capsule, Take 1 capsule by mouth every 7 days (Patient not taking: Reported on 8/21/2023), Disp: , Rfl:     Past Medical History:   Diagnosis Date    COVID     GUARDADO (dyspnea on exertion) 5/18/2021    Palpitations 5/18/2021       History reviewed. No pertinent surgical history. Social History     Tobacco Use   Smoking Status Never    Passive exposure: Never   Smokeless Tobacco Never       Social History     Socioeconomic History    Marital status:       Spouse name: None    Number of children: None    Years of education: None    Highest education level: None   Tobacco Use    Smoking

## 2023-08-22 LAB
ALBUMIN SERPL-MCNC: 4.3 G/DL (ref 3.8–4.9)
ALBUMIN/GLOB SERPL: 1.8 {RATIO} (ref 1.2–2.2)
ALP SERPL-CCNC: 81 IU/L (ref 44–121)
ALT SERPL-CCNC: 13 IU/L (ref 0–32)
AST SERPL-CCNC: 16 IU/L (ref 0–40)
BASOPHILS # BLD AUTO: 0 X10E3/UL (ref 0–0.2)
BASOPHILS NFR BLD AUTO: 1 %
BILIRUB SERPL-MCNC: 0.5 MG/DL (ref 0–1.2)
BUN SERPL-MCNC: 7 MG/DL (ref 6–24)
BUN/CREAT SERPL: 11 (ref 9–23)
CALCIUM SERPL-MCNC: 8.7 MG/DL (ref 8.7–10.2)
CHLORIDE SERPL-SCNC: 103 MMOL/L (ref 96–106)
CHOLEST SERPL-MCNC: 200 MG/DL (ref 100–199)
CO2 SERPL-SCNC: 20 MMOL/L (ref 20–29)
CREAT SERPL-MCNC: 0.63 MG/DL (ref 0.57–1)
EGFRCR SERPLBLD CKD-EPI 2021: 107 ML/MIN/1.73
EOSINOPHIL # BLD AUTO: 0 X10E3/UL (ref 0–0.4)
EOSINOPHIL NFR BLD AUTO: 1 %
ERYTHROCYTE [DISTWIDTH] IN BLOOD BY AUTOMATED COUNT: 14.4 % (ref 11.7–15.4)
GLOBULIN SER CALC-MCNC: 2.4 G/DL (ref 1.5–4.5)
GLUCOSE SERPL-MCNC: 86 MG/DL (ref 70–99)
HBA1C MFR BLD: 6 % (ref 4.8–5.6)
HCT VFR BLD AUTO: 36.9 % (ref 34–46.6)
HDLC SERPL-MCNC: 45 MG/DL
HGB BLD-MCNC: 11.6 G/DL (ref 11.1–15.9)
IMM GRANULOCYTES # BLD AUTO: 0 X10E3/UL (ref 0–0.1)
IMM GRANULOCYTES NFR BLD AUTO: 0 %
LDLC SERPL CALC-MCNC: 129 MG/DL (ref 0–99)
LYMPHOCYTES # BLD AUTO: 1.8 X10E3/UL (ref 0.7–3.1)
LYMPHOCYTES NFR BLD AUTO: 38 %
MCH RBC QN AUTO: 27.8 PG (ref 26.6–33)
MCHC RBC AUTO-ENTMCNC: 31.4 G/DL (ref 31.5–35.7)
MCV RBC AUTO: 88 FL (ref 79–97)
MONOCYTES # BLD AUTO: 0.3 X10E3/UL (ref 0.1–0.9)
MONOCYTES NFR BLD AUTO: 5 %
NEUTROPHILS # BLD AUTO: 2.6 X10E3/UL (ref 1.4–7)
NEUTROPHILS NFR BLD AUTO: 55 %
PLATELET # BLD AUTO: 325 X10E3/UL (ref 150–450)
POTASSIUM SERPL-SCNC: 4 MMOL/L (ref 3.5–5.2)
PROT SERPL-MCNC: 6.7 G/DL (ref 6–8.5)
RBC # BLD AUTO: 4.18 X10E6/UL (ref 3.77–5.28)
SODIUM SERPL-SCNC: 138 MMOL/L (ref 134–144)
TRIGL SERPL-MCNC: 143 MG/DL (ref 0–149)
VLDLC SERPL CALC-MCNC: 26 MG/DL (ref 5–40)
WBC # BLD AUTO: 4.8 X10E3/UL (ref 3.4–10.8)

## 2023-08-24 ENCOUNTER — OFFICE VISIT (OUTPATIENT)
Age: 53
End: 2023-08-24

## 2023-08-24 VITALS
WEIGHT: 122 LBS | DIASTOLIC BLOOD PRESSURE: 82 MMHG | HEIGHT: 60 IN | BODY MASS INDEX: 23.95 KG/M2 | OXYGEN SATURATION: 97 % | RESPIRATION RATE: 18 BRPM | HEART RATE: 78 BPM | SYSTOLIC BLOOD PRESSURE: 124 MMHG | TEMPERATURE: 97.2 F

## 2023-08-24 DIAGNOSIS — L23.7 CONTACT DERMATITIS DUE TO POISON IVY: Primary | ICD-10-CM

## 2023-08-24 RX ORDER — TRIAMCINOLONE ACETONIDE 40 MG/ML
40 INJECTION, SUSPENSION INTRA-ARTICULAR; INTRAMUSCULAR ONCE
Status: COMPLETED | OUTPATIENT
Start: 2023-08-24 | End: 2023-08-24

## 2023-08-24 RX ORDER — METHYLPREDNISOLONE 4 MG/1
TABLET ORAL
Qty: 1 KIT | Refills: 0 | Status: SHIPPED | OUTPATIENT
Start: 2023-08-24 | End: 2023-08-30

## 2023-08-24 RX ORDER — PRAMOXINE HYDROCHLORIDE AND ZINC ACETATE 10; 1 MG/ML; MG/ML
LOTION TOPICAL
Qty: 177 ML | Refills: 1 | Status: SHIPPED | OUTPATIENT
Start: 2023-08-24

## 2023-08-24 RX ORDER — SAL ACID/UREA/PETROLATUM,WHITE 5 %-10 %
OINTMENT (GRAM) TOPICAL
Qty: 14 EACH | Refills: 1 | Status: SHIPPED | OUTPATIENT
Start: 2023-08-24

## 2023-08-24 RX ADMIN — TRIAMCINOLONE ACETONIDE 40 MG: 40 INJECTION, SUSPENSION INTRA-ARTICULAR; INTRAMUSCULAR at 16:17

## 2023-08-24 ASSESSMENT — PATIENT HEALTH QUESTIONNAIRE - PHQ9
1. LITTLE INTEREST OR PLEASURE IN DOING THINGS: 0
SUM OF ALL RESPONSES TO PHQ QUESTIONS 1-9: 0
SUM OF ALL RESPONSES TO PHQ9 QUESTIONS 1 & 2: 0
2. FEELING DOWN, DEPRESSED OR HOPELESS: 0
SUM OF ALL RESPONSES TO PHQ QUESTIONS 1-9: 0

## 2023-08-25 NOTE — PROGRESS NOTES
Chief Complaint   Patient presents with    Rash     2 days ago, all over
and cooperative  Head: Normocephalic, without obvious abnormality, atraumatic, linear   Eyes: conjunctivae/corneas clear. PERRL, EOM's intact. Fundi benign. Ears: normal TM's and external ear canals both ears  Nose: Nares normal. Septum midline. Mucosa normal. No drainage or sinus tenderness. Throat: lips, mucosa, and tongue normal; teeth and gums normal  Lungs: clear to auscultation bilaterally  Heart: regular rate and rhythm, S1, S2 normal, no murmur, click, rub or gallop  Skin:  linear vesicular weeping rash  over nose and cheeks on face, chest and upper extremities. Large fluid filled and draining  clear   Lesions are erythematous and pruritic           ICD-10-CM    1. Contact dermatitis due to poison ivy  L23.7 triamcinolone acetonide (KENALOG-40) injection 40 mg        Orders Placed This Encounter    triamcinolone acetonide (KENALOG-40) injection 40 mg    methylPREDNISolone (MEDROL, MARIA LUZ,) 4 MG tablet     Sig: Take by mouth. Dispense:  1 kit     Refill:  0    Alum Sulfate-Ca Acetate (DOMEBORO) PACK     Sig: Apply to affected skin up to 3 times per day as needed     Dispense:  14 each     Refill:  1    pramoxine-zinc acetate (CALADRYL CLEAR) 1-0.1 % LOTN     Sig: Apply to poison ivy lesions on the skin surface. Dispense:  177 mL     Refill:  1    Follow up prn if symptoms persiss or worsen.   Randy OCONNORC

## 2023-09-13 PROBLEM — R07.89 CHEST DISCOMFORT: Status: ACTIVE | Noted: 2021-05-18

## 2023-09-26 ENCOUNTER — OFFICE VISIT (OUTPATIENT)
Age: 53
End: 2023-09-26
Payer: COMMERCIAL

## 2023-09-26 ENCOUNTER — TELEPHONE (OUTPATIENT)
Age: 53
End: 2023-09-26

## 2023-09-26 VITALS
SYSTOLIC BLOOD PRESSURE: 108 MMHG | DIASTOLIC BLOOD PRESSURE: 72 MMHG | BODY MASS INDEX: 24.68 KG/M2 | TEMPERATURE: 97 F | RESPIRATION RATE: 18 BRPM | HEART RATE: 86 BPM | HEIGHT: 59 IN | WEIGHT: 122.4 LBS | OXYGEN SATURATION: 98 %

## 2023-09-26 DIAGNOSIS — R00.2 PALPITATIONS: Primary | ICD-10-CM

## 2023-09-26 DIAGNOSIS — Z12.11 SCREENING FOR COLON CANCER: ICD-10-CM

## 2023-09-26 DIAGNOSIS — Z87.39 PERSONAL HISTORY OF RHEUMATOID ARTHRITIS: ICD-10-CM

## 2023-09-26 DIAGNOSIS — Z12.31 ENCOUNTER FOR SCREENING MAMMOGRAM FOR MALIGNANT NEOPLASM OF BREAST: ICD-10-CM

## 2023-09-26 DIAGNOSIS — E55.9 VITAMIN D DEFICIENCY, UNSPECIFIED: ICD-10-CM

## 2023-09-26 PROCEDURE — 99214 OFFICE O/P EST MOD 30 MIN: CPT | Performed by: NURSE PRACTITIONER

## 2023-09-26 PROCEDURE — 36415 COLL VENOUS BLD VENIPUNCTURE: CPT | Performed by: NURSE PRACTITIONER

## 2023-09-26 ASSESSMENT — PATIENT HEALTH QUESTIONNAIRE - PHQ9
SUM OF ALL RESPONSES TO PHQ QUESTIONS 1-9: 0
2. FEELING DOWN, DEPRESSED OR HOPELESS: 0
1. LITTLE INTEREST OR PLEASURE IN DOING THINGS: 0
SUM OF ALL RESPONSES TO PHQ9 QUESTIONS 1 & 2: 0
SUM OF ALL RESPONSES TO PHQ QUESTIONS 1-9: 0

## 2023-09-26 NOTE — TELEPHONE ENCOUNTER
Pt needs CPT for colonoscopy for her insurance company so they can let her know if she will need prior auth or not.

## 2023-09-26 NOTE — PROGRESS NOTES
After Visit Summary   11/8/2018    Sebastien Hoover    MRN: [id number]           Patient Information     Date Of Birth          1971        Visit Information        Provider Department      11/8/2018 10:00 AM UR BD 01 [facility], Infusion [facility]        Today's Diagnoses     Severe episode of recurrent major depressive disorder, without psychotic features (H)    -  1    Severe recurrent major depression without psychotic features (H)           Follow-ups after your visit        Your next 10 appointments already scheduled     Nov 15, 2018  2:00 PM CST   Adult Med Follow UP with Venancio Ochoa MD   Psychiatry Clinic ([facility])    [address]  [address] [address]   [phone]            Nov 20, 2018 10:30 AM CST   Level 3 with UR CH 06   [facility] ([facility])    [address] [place] [address] [address]  [address]  [facility] [address]   [phone]            Dec 13, 2018 10:00 AM CST   Level 3 with UR CH 03   [facility] ([facility])    [address] [place] [address] [address]  [address]  [facility] [address]   [phone]            Dec 28, 2018 10:30 AM CST   Level 3 with UR CH 03   [facility]fusion [facility] ([facility])    [address] [place] [address] [address]  [address]  [facility] [address]   [phone]            Yoseph 10, 2019 10:30 AM CST   Level 3 with UR BD 01   [facility] Infusion [facility] ([facility])    [address] [place] [address] [address]  [address]  [facility] [address]   [phone]            Jan 17, 2019  1:00 PM CST   Adult Med Follow UP with Venancio Ochoa MD   Psychiatry Clinic ([facility])    [facility]  2nd Fl [address]  [address] [address]   [phone]            Mar 14, 2019  1:00 PM CDT  Patient here for labs only drawn from left antecubital without pain or bruising.   Adult Med Follow UP with Venancio Ochoa MD   Psychiatry Clinic ([facility])    [place] [id number]  [address] [address] [phone]            May 16, 2019  1:00 PM CDT   Adult Med Follow UP with Venancio Ochoa MD   Psychiatry Clinic ([facility])    [place] [id number]  [address] [address] [phone]            Jul 18, 2019  1:00 PM CDT   Adult Med Follow UP with Venancio Ochoa MD   Psychiatry Clinic ([facility])    [place] [id number]  [address] [address]   [phone]            Sep 19, 2019  1:00 PM CDT   Adult Med Follow UP with Venancio Ochoa MD   Psychiatry Clinic ([facility])    [place] [id number]  [facility] [address] [address] [phone]              Who to contact     If you have questions or need follow up information about today's clinic visit or your schedule please contact [facility], [place], [facility] directly at [phone].  Normal or non-critical lab and imaging results will be communicated to you by Locaihart, letter or phone within 4 business days after the clinic has received the results. If you do not hear from us within 7 days, please contact the clinic through Locaihart or phone. If you have a critical or abnormal lab result, we will notify you by phone as soon as possible.  Submit refill requests through Con[employer] or call your pharmacy and they will forward the refill request to us. Please allow 3 business days for your refill to be completed.          Additional Information About Your Visit        [employer] Information     Conjecta gives you secure access to your electronic health record. If you see a primary care provider, you can also send messages to your care team and make appointments. If you have questions, please call your primary care clinic.  If you do  not have a primary care provider, please call [phone] and they will assist you.        Care EveryWhere ID     This is your Care EveryWhere ID. This could be used by other organizations to access your [facility] medical records  [id number]        Your Vitals Were     Pulse Temperature Respirations Pulse Oximetry          80 98.4  F (36.9  C) (Oral) 18 97%         Blood Pressure from Last 3 Encounters:   11/08/18 127/89   11/02/18 (!) 147/100   10/26/18 (!) 155/102    Weight from Last 3 Encounters:   11/02/18 86.5 kg (190 lb 9.6 oz)   10/26/18 87.3 kg (192 lb 6.4 oz)   10/11/18 85.5 kg (188 lb 7.9 oz)              Today, you had the following     No orders found for display       Primary Care Provider Office Phone # Fax #    Junior Buenrostro [phone]       [facility] [id number] [facility] [address]        Equal Access to Services     STEVEN ARTHUR : Hadii aad ku hadasho Soomaali, waaxda luqadaha, qaybta kaalmada adeegyada, waxay idiin hayjuden shaggy castillo . So [facility] [phone].    ATENCIÓN: Si habla español, tiene a madrigal disposición servicios gratuitos de asistencia lingüística. Llame al [phone].    We comply with applicable federal civil rights laws and [place] laws. We do not discriminate on the basis of race, color, national origin, age, disability, sex, sexual orientation, or gender identity.            Thank you!     Thank you for choosing [facility] SERVICES  for your care. Our goal is always to provide you with excellent care. Hearing back from our patients is one way we can continue to improve our services. Please take a few minutes to complete the written survey that you may receive in the mail after your visit with us. Thank you!             Your Updated Medication List - Protect others around you: Learn how to safely use, store and throw away your medicines at www.disposemymeds.org.          This list is accurate as of 11/8/18  3:43 PM.   Always use your most recent med list.                   Brand Name Dispense Instructions for use Diagnosis    KETAMINE HCL      Infusion        lithium 150 MG capsule     90 capsule    Take 1 capsule (150 mg) by mouth daily    Major depressive disorder, recurrent, severe without psychotic features (H)       methylphenidate 10 MG tablet   Start taking on:  11/20/2018    RITALIN    120 tablet    4 tablets daily in divided doses    Severe episode of recurrent major depressive disorder, without psychotic features (H)       sertraline 25 MG tablet    ZOLOFT    1 tablet    Take 0.5 tablets (12.5 mg) by mouth daily    Major depressive disorder, recurrent, severe without psychotic features (H)

## 2023-09-27 DIAGNOSIS — E55.9 VITAMIN D DEFICIENCY: Primary | ICD-10-CM

## 2023-09-27 LAB
25(OH)D3 SERPL-MCNC: 10.6 NG/ML (ref 30–100)
CRP SERPL-MCNC: <0.29 MG/DL (ref 0–0.6)

## 2023-09-27 RX ORDER — ERGOCALCIFEROL 1.25 MG/1
50000 CAPSULE ORAL WEEKLY
Qty: 12 CAPSULE | Refills: 1 | Status: SHIPPED | OUTPATIENT
Start: 2023-09-27

## 2023-10-02 ENCOUNTER — TELEPHONE (OUTPATIENT)
Facility: HOSPITAL | Age: 53
End: 2023-10-02

## 2023-10-03 ENCOUNTER — APPOINTMENT (OUTPATIENT)
Facility: HOSPITAL | Age: 53
End: 2023-10-03
Attending: INTERNAL MEDICINE
Payer: COMMERCIAL

## 2023-10-03 ENCOUNTER — HOSPITAL ENCOUNTER (OUTPATIENT)
Facility: HOSPITAL | Age: 53
Discharge: HOME | End: 2023-10-05
Attending: INTERNAL MEDICINE
Payer: COMMERCIAL

## 2023-10-03 ENCOUNTER — HOSPITAL ENCOUNTER (OUTPATIENT)
Facility: HOSPITAL | Age: 53
End: 2023-10-03
Attending: INTERNAL MEDICINE
Payer: COMMERCIAL

## 2023-10-03 DIAGNOSIS — R06.09 DOE (DYSPNEA ON EXERTION): Primary | ICD-10-CM

## 2023-10-03 LAB
STRESS BASELINE DIAS BP: 76 MMHG
STRESS BASELINE HR: 65 BPM
STRESS BASELINE SYS BP: 127 MMHG
STRESS ESTIMATED WORKLOAD: 10.1 METS
STRESS EXERCISE DUR MIN: 9 MIN
STRESS EXERCISE DUR SEC: 1 SEC
STRESS PEAK DIAS BP: 64 MMHG
STRESS PEAK SYS BP: 129 MMHG
STRESS PERCENT HR ACHIEVED: 76 %
STRESS POST PEAK HR: 127 BPM
STRESS RATE PRESSURE PRODUCT: NORMAL BPM*MMHG
STRESS ST DEPRESSION: 0 MM
STRESS STAGE 1 BP: NORMAL MMHG
STRESS STAGE 1 DURATION: 3 MIN:SEC
STRESS STAGE 1 HR: 96 BPM
STRESS STAGE 2 BP: NORMAL MMHG
STRESS STAGE 2 DURATION: 3 MIN:SEC
STRESS STAGE 2 HR: 107 BPM
STRESS STAGE 3 BP: NORMAL MMHG
STRESS STAGE 3 DURATION: 3 MIN:SEC
STRESS STAGE 3 HR: 127 BPM
STRESS STAGE 4 DURATION: NORMAL MIN:SEC
STRESS STAGE 4 HR: 129 BPM
STRESS STAGE RECOVERY 1 BP: NORMAL MMHG
STRESS STAGE RECOVERY 1 DURATION: NORMAL MIN:SEC
STRESS STAGE RECOVERY 1 HR: 68 BPM
STRESS TARGET HR: 168 BPM

## 2023-10-03 PROCEDURE — 93017 CV STRESS TEST TRACING ONLY: CPT

## 2023-10-03 PROCEDURE — 93016 CV STRESS TEST SUPVJ ONLY: CPT | Performed by: INTERNAL MEDICINE

## 2023-10-03 PROCEDURE — 93018 CV STRESS TEST I&R ONLY: CPT | Performed by: INTERNAL MEDICINE

## 2023-10-04 LAB
ANA BY IFA: POSITIVE
Lab: ABNORMAL
SPECKLED PATTERN: ABNORMAL

## 2023-10-05 LAB
14-3-3 ETA AG SER IA-MCNC: <0.2 NG/ML
CCP IGA+IGG SERPL IA-ACNC: <20 UNITS
RHEUMATOID FACT SERPL-ACNC: <14 UNITS/ML

## 2023-10-06 ENCOUNTER — TELEPHONE (OUTPATIENT)
Age: 53
End: 2023-10-06

## 2023-10-06 NOTE — TELEPHONE ENCOUNTER
----- Message from Joel Whitfield LPN sent at 90/1/8559 10:44 AM EDT -----    ----- Message -----  From: Hilaria Rubio MD  Sent: 10/5/2023   6:06 PM EDT  To: Joel Whitfield LPN    Carmelita, please inform Ms. Marleny Paula that her monitor was unremarkable. Normal rhythm throughout with no concerning rhythm abnormalities. More specifically, there were no rhythm abnormalities on the tracings associated with symptoms. This is reassuring. No medications are necessary. Based on the monitor and the recent stress test, her heart appears to be doing well. Thanks!

## 2023-11-21 ENCOUNTER — PREP FOR PROCEDURE (OUTPATIENT)
Age: 53
End: 2023-11-21

## 2023-11-21 ENCOUNTER — OFFICE VISIT (OUTPATIENT)
Age: 53
End: 2023-11-21

## 2023-11-21 VITALS
OXYGEN SATURATION: 97 % | BODY MASS INDEX: 24.6 KG/M2 | HEART RATE: 73 BPM | HEIGHT: 59 IN | SYSTOLIC BLOOD PRESSURE: 112 MMHG | RESPIRATION RATE: 16 BRPM | WEIGHT: 122 LBS | TEMPERATURE: 98.6 F | DIASTOLIC BLOOD PRESSURE: 76 MMHG

## 2023-11-21 DIAGNOSIS — Z12.11 COLON CANCER SCREENING: ICD-10-CM

## 2023-11-21 DIAGNOSIS — Z12.11 COLON CANCER SCREENING: Primary | ICD-10-CM

## 2023-11-21 ASSESSMENT — PATIENT HEALTH QUESTIONNAIRE - PHQ9
SUM OF ALL RESPONSES TO PHQ QUESTIONS 1-9: 0
2. FEELING DOWN, DEPRESSED OR HOPELESS: 0
SUM OF ALL RESPONSES TO PHQ QUESTIONS 1-9: 0
1. LITTLE INTEREST OR PLEASURE IN DOING THINGS: 0
SUM OF ALL RESPONSES TO PHQ9 QUESTIONS 1 & 2: 0

## 2023-11-21 NOTE — PROGRESS NOTES
Elaine Licona is a 47 y.o. female who presents today with the following:  Chief Complaint   Patient presents with    New Patient    Colonoscopy       HPI    52-year-old female who presents as referral from Dr. Lon Sanchez for possible initial screening colonoscopy. She denies any first-degree relatives with colon cancer. She denies any personal history of melena or hematochezia or diarrhea or constipation. She denies any abdominal pain or unexpected weight loss or change in the caliber of her stool. She has been having some atypical chest pain for which she has been evaluated by cardiology who does not feel that this is a cardiac origin. She lost her  in 2020 to lung cancer even though he was not a smoker and has been having some understandable grief. She believes she has no underlying medical conditions. She states she has had no prior surgeries. She does not smoke or drink. She has been vaccinated for COVID and has had COVID but never has been hospitalized. She is not on aspirin or any other blood thinners. Past Medical History:   Diagnosis Date    COVID     GUARDADO (dyspnea on exertion) 5/18/2021    Palpitations 5/18/2021       No past surgical history on file. Social History     Socioeconomic History    Marital status:       Spouse name: Not on file    Number of children: Not on file    Years of education: Not on file    Highest education level: Not on file   Occupational History    Not on file   Tobacco Use    Smoking status: Never     Passive exposure: Never    Smokeless tobacco: Never   Vaping Use    Vaping Use: Never used   Substance and Sexual Activity    Alcohol use: Never    Drug use: Never    Sexual activity: Defer   Other Topics Concern    Not on file   Social History Narrative    Not on file     Social Determinants of Health     Financial Resource Strain: High Risk (9/21/2022)    Overall Financial Resource Strain (CARDIA)     Difficulty of Paying Living Expenses: Hard

## 2023-11-21 NOTE — PROGRESS NOTES
Identified pt with two pt identifiers (name and ). Reviewed chart in preparation for visit and have obtained necessary documentation. Arnoldo Ford is a 47 y.o. female New Patient and Colonoscopy  . There were no vitals filed for this visit. 1. Have you been to the ER, urgent care clinic since your last visit? Hospitalized since your last visit?  no     2. Have you seen or consulted any other health care providers outside of the [address] [place] [address] since your last visit? Include any pap smears or colon screening.   yes [facility] [place] [facility]

## 2023-12-21 PROBLEM — Z12.11 COLON CANCER SCREENING: Status: RESOLVED | Noted: 2023-11-21 | Resolved: 2023-12-21

## 2024-03-20 ENCOUNTER — OFFICE VISIT (OUTPATIENT)
Age: 54
End: 2024-03-20
Payer: MEDICAID

## 2024-03-20 VITALS
TEMPERATURE: 97.8 F | WEIGHT: 124 LBS | DIASTOLIC BLOOD PRESSURE: 76 MMHG | OXYGEN SATURATION: 96 % | RESPIRATION RATE: 16 BRPM | SYSTOLIC BLOOD PRESSURE: 110 MMHG | HEIGHT: 59 IN | BODY MASS INDEX: 25 KG/M2

## 2024-03-20 DIAGNOSIS — L24.7 IRRITANT CONTACT DERMATITIS DUE TO PLANTS, EXCEPT FOOD: Primary | ICD-10-CM

## 2024-03-20 PROCEDURE — 99213 OFFICE O/P EST LOW 20 MIN: CPT | Performed by: NURSE PRACTITIONER

## 2024-03-20 RX ORDER — METHYLPREDNISOLONE 4 MG/1
TABLET ORAL
Qty: 1 KIT | Refills: 0 | Status: SHIPPED | OUTPATIENT
Start: 2024-03-20 | End: 2024-03-26

## 2024-03-20 RX ORDER — TRIAMCINOLONE ACETONIDE 40 MG/ML
40 INJECTION, SUSPENSION INTRA-ARTICULAR; INTRAMUSCULAR ONCE
Status: COMPLETED | OUTPATIENT
Start: 2024-03-20 | End: 2024-03-20

## 2024-03-20 RX ADMIN — TRIAMCINOLONE ACETONIDE 40 MG: 40 INJECTION, SUSPENSION INTRA-ARTICULAR; INTRAMUSCULAR at 14:26

## 2024-03-20 ASSESSMENT — PATIENT HEALTH QUESTIONNAIRE - PHQ9
2. FEELING DOWN, DEPRESSED OR HOPELESS: NOT AT ALL
SUM OF ALL RESPONSES TO PHQ9 QUESTIONS 1 & 2: 0
1. LITTLE INTEREST OR PLEASURE IN DOING THINGS: NOT AT ALL
SUM OF ALL RESPONSES TO PHQ QUESTIONS 1-9: 0

## 2024-03-20 NOTE — PROGRESS NOTES
Patient was administered triamcinolone 40 mg via IM injection.  Patient tolerated medication without.  Medication information reviewed with patient, patient states understanding. Patient to resume routine medications at home.  Patient given copy of AVS with medication information and instructions for home.  AVS reviewed with patient and patient states understanding.

## 2024-03-20 NOTE — PROGRESS NOTES
\"Have you been to the ER, urgent care clinic since your last visit?  Hospitalized since your last visit?\"    NO    “Have you seen or consulted any other health care providers outside of [facility] since your last visit?”      Have you had a mammogram?”   NO    Date of last Mammogram: 4/26/2021         “Have you had a colorectal cancer screening such as a colonoscopy/FIT/Cologuard?     Yes occult  9-2022    No colonoscopy on file  No cologuard on file  Date of last FIT: 9/22/2022   No flexible sigmoidoscopy on file         Click Here for Release of Records Request

## 2024-03-21 NOTE — PROGRESS NOTES
Subjective:       Sury Sanchez is a 54 y.o. female who presents for evaluation of rash involving the chest, forearm, and upper extremity. Rash started a few days ago. Lesions are pink in color, and blistering in texture. Rash has not changed over time. Rash is pruritic. Associated symptoms: none. Patient denies: cough, congestion. Patient has had contacts with similar rash. Patient has had new exposures (soaps, lotions, laundry detergents, foods, medications, plants, insects or animals.)    Past Medical History:   Diagnosis Date    COVID     GUARDADO (dyspnea on exertion) 5/18/2021    Palpitations 5/18/2021     Patient Active Problem List    Diagnosis Date Noted    Abnormal EKG 05/18/2021    Chest discomfort 05/18/2021    Palpitations 05/18/2021    GUARDADO (dyspnea on exertion) 05/18/2021     History reviewed. No pertinent surgical history.  Family History   Problem Relation Age of Onset    Hypertension Mother     Diabetes Mother     Rheum Arthritis Father      Social History     Socioeconomic History    Marital status:      Spouse name: None    Number of children: None    Years of education: None    Highest education level: None   Tobacco Use    Smoking status: Never     Passive exposure: Never    Smokeless tobacco: Never   Vaping Use    Vaping Use: Never used   Substance and Sexual Activity    Alcohol use: Never    Drug use: Never    Sexual activity: Defer     Social Determinants of Health     Financial Resource Strain: High Risk (9/21/2022)    Overall Financial Resource Strain (CARDIA)     Difficulty of Paying Living Expenses: Hard   Food Insecurity: Food Insecurity Present (9/21/2022)    Hunger Vital Sign     Worried About Running Out of Food in the Last Year: Sometimes true     Ran Out of Food in the Last Year: Sometimes true   Transportation Needs: Unmet Transportation Needs (9/21/2022)    PRAPARE - Transportation     Lack of Transportation (Medical): Yes     Lack of Transportation (Non-Medical): Yes   Physical

## 2024-04-17 ENCOUNTER — OFFICE VISIT (OUTPATIENT)
Age: 54
End: 2024-04-17

## 2024-04-17 VITALS
BODY MASS INDEX: 23.83 KG/M2 | TEMPERATURE: 96.8 F | SYSTOLIC BLOOD PRESSURE: 92 MMHG | RESPIRATION RATE: 18 BRPM | HEART RATE: 59 BPM | WEIGHT: 118.2 LBS | DIASTOLIC BLOOD PRESSURE: 60 MMHG | OXYGEN SATURATION: 98 % | HEIGHT: 59 IN

## 2024-04-17 DIAGNOSIS — L23.7 POISON IVY: Primary | ICD-10-CM

## 2024-04-17 RX ORDER — TRIAMCINOLONE ACETONIDE 40 MG/ML
40 INJECTION, SUSPENSION INTRA-ARTICULAR; INTRAMUSCULAR ONCE
Status: COMPLETED | OUTPATIENT
Start: 2024-04-17 | End: 2024-04-17

## 2024-04-17 RX ORDER — METHYLPREDNISOLONE 4 MG/1
TABLET ORAL
Qty: 1 KIT | Refills: 0 | Status: SHIPPED | OUTPATIENT
Start: 2024-04-17 | End: 2024-04-23

## 2024-04-17 RX ADMIN — TRIAMCINOLONE ACETONIDE 40 MG: 40 INJECTION, SUSPENSION INTRA-ARTICULAR; INTRAMUSCULAR at 14:43

## 2024-04-17 ASSESSMENT — PATIENT HEALTH QUESTIONNAIRE - PHQ9
SUM OF ALL RESPONSES TO PHQ QUESTIONS 1-9: 0
SUM OF ALL RESPONSES TO PHQ QUESTIONS 1-9: 0
1. LITTLE INTEREST OR PLEASURE IN DOING THINGS: NOT AT ALL
SUM OF ALL RESPONSES TO PHQ QUESTIONS 1-9: 0
SUM OF ALL RESPONSES TO PHQ QUESTIONS 1-9: 0
SUM OF ALL RESPONSES TO PHQ9 QUESTIONS 1 & 2: 0
2. FEELING DOWN, DEPRESSED OR HOPELESS: NOT AT ALL

## 2024-04-17 NOTE — PROGRESS NOTES
\"Have you been to the ER, urgent care clinic since your last visit?  Hospitalized since your last visit?\"    NO    “Have you seen or consulted any other health care providers outside of [facility] since your last visit?”    NO    Have you had a mammogram?”       Date of last Mammogram: 4/26/2021         “Have you had a colorectal cancer screening such as a colonoscopy/FIT/Cologuard?    no    No colonoscopy on file  No cologuard on file  Date of last FIT: 9/22/2022   No flexible sigmoidoscopy on file         Click Here for Release of Records Request

## 2024-04-17 NOTE — PROGRESS NOTES
Patient was administered triamcinolone 40 mg via IM injection.  Patient tolerated medication without noted side effects.  Medication information reviewed with patient, patient states understanding. Patient to resume routine medications at home.  Patient given copy of AVS with medication information and instructions for home.  AVS reviewed with patient and patient states understanding.

## 2024-04-17 NOTE — PROGRESS NOTES
Chief Complaint   Patient presents with    Poison Ivy       Vitals:    04/17/24 1351   BP: 92/60   Pulse: 59   Resp: 18   Temp: 96.8 °F (36 °C)   SpO2: 98%

## 2024-04-18 NOTE — PROGRESS NOTES
Subjective:       Sury Sanchez is a 54 y.o. female who presents for evaluation of rash involving the chest, face, upper extremity, and abdomen . Rash started 3 days ago. Lesions are pink and red in color, and raised in texture. Rash has changed over time. Rash is painful and is pruritic. Associated symptoms: none. Patient denies: fever, cough, congestion, sore throat, headache. Patient has had contacts with similar rash. Patient has not had new exposures (soaps, lotions, laundry detergents, foods, medications, plants, insects or animals.)    Past Medical History:   Diagnosis Date    COVID     GUARDADO (dyspnea on exertion) 5/18/2021    Palpitations 5/18/2021     Patient Active Problem List    Diagnosis Date Noted    Abnormal EKG 05/18/2021    Chest discomfort 05/18/2021    Palpitations 05/18/2021    GUARDADO (dyspnea on exertion) 05/18/2021     History reviewed. No pertinent surgical history.  Family History   Problem Relation Age of Onset    Hypertension Mother     Diabetes Mother     Rheum Arthritis Father      Social History     Socioeconomic History    Marital status:      Spouse name: None    Number of children: None    Years of education: None    Highest education level: None   Tobacco Use    Smoking status: Never     Passive exposure: Never    Smokeless tobacco: Never   Vaping Use    Vaping Use: Never used   Substance and Sexual Activity    Alcohol use: Never    Drug use: Never    Sexual activity: Defer     Social Determinants of Health     Financial Resource Strain: High Risk (9/21/2022)    Overall Financial Resource Strain (CARDIA)     Difficulty of Paying Living Expenses: Hard   Food Insecurity: Food Insecurity Present (9/21/2022)    Hunger Vital Sign     Worried About Running Out of Food in the Last Year: Sometimes true     Ran Out of Food in the Last Year: Sometimes true   Transportation Needs: Unmet Transportation Needs (9/21/2022)    PRAPARE - Transportation     Lack of Transportation (Medical): Yes

## 2024-06-10 ENCOUNTER — OFFICE VISIT (OUTPATIENT)
Age: 54
End: 2024-06-10
Payer: MEDICAID

## 2024-06-10 VITALS
OXYGEN SATURATION: 98 % | WEIGHT: 114.2 LBS | HEART RATE: 69 BPM | SYSTOLIC BLOOD PRESSURE: 98 MMHG | RESPIRATION RATE: 20 BRPM | HEIGHT: 59 IN | TEMPERATURE: 97 F | DIASTOLIC BLOOD PRESSURE: 66 MMHG | BODY MASS INDEX: 23.02 KG/M2

## 2024-06-10 DIAGNOSIS — L30.9 DERMATITIS: ICD-10-CM

## 2024-06-10 DIAGNOSIS — L23.7 POISON IVY: Primary | ICD-10-CM

## 2024-06-10 PROCEDURE — 96372 THER/PROPH/DIAG INJ SC/IM: CPT | Performed by: NURSE PRACTITIONER

## 2024-06-10 PROCEDURE — 99213 OFFICE O/P EST LOW 20 MIN: CPT | Performed by: NURSE PRACTITIONER

## 2024-06-10 RX ORDER — METHYLPREDNISOLONE 4 MG/1
TABLET ORAL
Qty: 1 KIT | Refills: 0 | Status: SHIPPED | OUTPATIENT
Start: 2024-06-10 | End: 2024-06-16

## 2024-06-10 RX ORDER — TRIAMCINOLONE ACETONIDE 40 MG/ML
40 INJECTION, SUSPENSION INTRA-ARTICULAR; INTRAMUSCULAR ONCE
Status: COMPLETED | OUTPATIENT
Start: 2024-06-10 | End: 2024-06-10

## 2024-06-10 RX ORDER — BENZOCAINE/MENTHOL 6 MG-10 MG
LOZENGE MUCOUS MEMBRANE
Qty: 30 G | Refills: 1 | Status: SHIPPED | OUTPATIENT
Start: 2024-06-10 | End: 2024-06-17

## 2024-06-10 RX ADMIN — TRIAMCINOLONE ACETONIDE 40 MG: 40 INJECTION, SUSPENSION INTRA-ARTICULAR; INTRAMUSCULAR at 15:31

## 2024-06-10 ASSESSMENT — PATIENT HEALTH QUESTIONNAIRE - PHQ9
SUM OF ALL RESPONSES TO PHQ QUESTIONS 1-9: 0
SUM OF ALL RESPONSES TO PHQ QUESTIONS 1-9: 0
SUM OF ALL RESPONSES TO PHQ9 QUESTIONS 1 & 2: 0
2. FEELING DOWN, DEPRESSED OR HOPELESS: NOT AT ALL
1. LITTLE INTEREST OR PLEASURE IN DOING THINGS: NOT AT ALL
SUM OF ALL RESPONSES TO PHQ QUESTIONS 1-9: 0
SUM OF ALL RESPONSES TO PHQ QUESTIONS 1-9: 0

## 2024-06-12 NOTE — PROGRESS NOTES
Patient was administered kenalog 40 mg via IM injection.  Patient tolerated medication without noted side effects.  Medication information reviewed with patient, patient states understanding. Patient to resume routine medications at home.  Patient given copy of AVS with medication information and instructions for home.  AVS reviewed with patient and patient states understanding.     \"Have you been to the ER, urgent care clinic since your last visit?  Hospitalized since your last visit?\"    NO    “Have you seen or consulted any other health care providers outside of [facility] since your last visit?”    NO    Have you had a mammogram?”       Date of last Mammogram: 4/26/2021         “Have you had a colorectal cancer screening such as a colonoscopy/FIT/Cologuard?    no    No colonoscopy on file  No cologuard on file  Date of last FIT: 9/22/2022   No flexible sigmoidoscopy on file         Click Here for Release of Records Request      Chief Complaint   Patient presents with    Poison Ivy    Dizziness         Vitals:    06/10/24 1447   BP: 98/66   Pulse: 69   Resp: 20   Temp: 97 °F (36.1 °C)   SpO2: 98%     
Upper Arm, Position: Sitting, Cuff Size: Medium Adult)   Pulse 69   Temp 97 °F (36.1 °C) (Temporal)   Resp 20   Ht 1.499 m (4' 11\")   Wt 51.8 kg (114 lb 3.2 oz)   LMP 04/14/2024   SpO2 98%   BMI 23.07 kg/m²   General:  appears stated age and cooperative   Skin:  vesicle(s) noted on face, trunk, and upper and lower extremities      General: Alert and oriented. No acute distress.  Well nourished  HEENT :  Eyes: pupils equal, round, react to light and accommodation.    Nose: patent. Mouth and throat is clear.  Neck:supple full range of motion no thyromegaly. Trachea midline, No carotid bruits. No significant lymphadenopathy  Lungs:: clear to auscultation without wheezes, rales, or rhonchi.  Heart :RRR, S1 & S2 are normal intensity. No murmur; no gallop  Extremities: without clubbing, cyanosis, or edema  Pulses: radial and femoral pulses are normal  Neuro: HMF intact. Cranial nerves II through XII grossly normal.  Assessment:       1. Poison ivy    - triamcinolone acetonide (KENALOG-40) injection 40 mg  - methylPREDNISolone (MEDROL DOSEPACK) 4 MG tablet; Take by mouth.  Dispense: 1 kit; Refill: 0  - hydrocortisone (ALA-MILES) 1 % cream; Apply topically 2 times daily.  Dispense: 30 g; Refill: 1    2. Dermatitis  - triamcinolone acetonide (KENALOG-40) injection 40 mg  - methylPREDNISolone (MEDROL DOSEPACK) 4 MG tablet; Take by mouth.  Dispense: 1 kit; Refill: 0  - hydrocortisone (ALA-MILES) 1 % cream; Apply topically 2 times daily.  Dispense: 30 g; Refill: 1         Plan:        Orders Placed This Encounter    triamcinolone acetonide (KENALOG-40) injection 40 mg    methylPREDNISolone (MEDROL DOSEPACK) 4 MG tablet     Sig: Take by mouth.     Dispense:  1 kit     Refill:  0    hydrocortisone (ALA-MILES) 1 % cream     Sig: Apply topically 2 times daily.     Dispense:  30 g     Refill:  1    Follow up prn if symptoms persist or worsen.  Lyn ACEVES

## 2024-06-28 ENCOUNTER — HOSPITAL ENCOUNTER (OUTPATIENT)
Facility: HOSPITAL | Age: 54
End: 2024-06-28
Payer: MEDICAID

## 2024-06-28 PROCEDURE — 77063 BREAST TOMOSYNTHESIS BI: CPT

## 2025-06-10 ENCOUNTER — TRANSCRIBE ORDERS (OUTPATIENT)
Facility: HOSPITAL | Age: 55
End: 2025-06-10

## 2025-06-10 DIAGNOSIS — Z12.31 SCREENING MAMMOGRAM FOR BREAST CANCER: Primary | ICD-10-CM

## 2025-07-01 ENCOUNTER — HOSPITAL ENCOUNTER (OUTPATIENT)
Facility: HOSPITAL | Age: 55
Discharge: HOME | End: 2025-07-04
Payer: MEDICAID

## 2025-07-01 DIAGNOSIS — Z12.31 SCREENING MAMMOGRAM FOR BREAST CANCER: ICD-10-CM

## 2025-07-01 PROCEDURE — 77063 BREAST TOMOSYNTHESIS BI: CPT

## 2025-07-02 ENCOUNTER — RESULTS FOLLOW-UP (OUTPATIENT)
Age: 55
End: 2025-07-02

## 2025-08-29 ENCOUNTER — TELEPHONE (OUTPATIENT)
Age: 55
End: 2025-08-29